# Patient Record
Sex: FEMALE | Race: WHITE | NOT HISPANIC OR LATINO | Employment: OTHER | URBAN - METROPOLITAN AREA
[De-identification: names, ages, dates, MRNs, and addresses within clinical notes are randomized per-mention and may not be internally consistent; named-entity substitution may affect disease eponyms.]

---

## 2017-03-01 ENCOUNTER — GENERIC CONVERSION - ENCOUNTER (OUTPATIENT)
Dept: OTHER | Facility: OTHER | Age: 82
End: 2017-03-01

## 2017-04-10 ENCOUNTER — ALLSCRIPTS OFFICE VISIT (OUTPATIENT)
Dept: OTHER | Facility: OTHER | Age: 82
End: 2017-04-10

## 2017-04-24 ENCOUNTER — ALLSCRIPTS OFFICE VISIT (OUTPATIENT)
Dept: OTHER | Facility: OTHER | Age: 82
End: 2017-04-24

## 2017-04-24 DIAGNOSIS — K59.01 SLOW TRANSIT CONSTIPATION: ICD-10-CM

## 2017-05-01 ENCOUNTER — GENERIC CONVERSION - ENCOUNTER (OUTPATIENT)
Dept: OTHER | Facility: OTHER | Age: 82
End: 2017-05-01

## 2017-05-01 ENCOUNTER — TRANSCRIBE ORDERS (OUTPATIENT)
Dept: ADMINISTRATIVE | Facility: HOSPITAL | Age: 82
End: 2017-05-01

## 2017-05-01 ENCOUNTER — HOSPITAL ENCOUNTER (OUTPATIENT)
Dept: RADIOLOGY | Facility: HOSPITAL | Age: 82
Discharge: HOME | End: 2017-05-01
Attending: FAMILY MEDICINE
Payer: MEDICARE

## 2017-05-01 DIAGNOSIS — K59.01 SLOW TRANSIT CONSTIPATION: ICD-10-CM

## 2017-05-01 PROCEDURE — 74022 RADEX COMPL AQT ABD SERIES: CPT

## 2017-05-11 ENCOUNTER — LAB CONVERSION - ENCOUNTER (OUTPATIENT)
Dept: OTHER | Facility: OTHER | Age: 82
End: 2017-05-11

## 2017-05-11 ENCOUNTER — GENERIC CONVERSION - ENCOUNTER (OUTPATIENT)
Dept: OTHER | Facility: OTHER | Age: 82
End: 2017-05-11

## 2017-05-11 LAB
A/G RATIO (HISTORICAL): 1.1 (CALC) (ref 1–2.5)
ALBUMIN SERPL BCP-MCNC: 3.3 G/DL (ref 3.6–5.1)
ALP SERPL-CCNC: 86 U/L (ref 33–130)
ALT SERPL W P-5'-P-CCNC: 10 U/L (ref 6–29)
AST SERPL W P-5'-P-CCNC: 13 U/L (ref 10–35)
BASOPHILS # BLD AUTO: 0.2 %
BASOPHILS # BLD AUTO: 14 CELLS/UL (ref 0–200)
BILIRUB SERPL-MCNC: 0.6 MG/DL (ref 0.2–1.2)
BUN SERPL-MCNC: 24 MG/DL (ref 7–25)
BUN/CREA RATIO (HISTORICAL): 44 (CALC) (ref 6–22)
CALCIUM SERPL-MCNC: 8.8 MG/DL (ref 8.6–10.4)
CHLORIDE SERPL-SCNC: 107 MMOL/L (ref 98–110)
CO2 SERPL-SCNC: 26 MMOL/L (ref 20–31)
CREAT SERPL-MCNC: 0.55 MG/DL (ref 0.6–0.88)
DEPRECATED RDW RBC AUTO: 13.9 % (ref 11–15)
EGFR AFRICAN AMERICAN (HISTORICAL): 102 ML/MIN/1.73M2
EGFR-AMERICAN CALC (HISTORICAL): 88 ML/MIN/1.73M2
EOSINOPHIL # BLD AUTO: 147 CELLS/UL (ref 15–500)
EOSINOPHIL # BLD AUTO: 2.1 %
GAMMA GLOBULIN (HISTORICAL): 3 G/DL (CALC) (ref 1.9–3.7)
GLUCOSE (HISTORICAL): 88 MG/DL (ref 65–99)
HBA1C MFR BLD HPLC: 6.6 % OF TOTAL HGB
HCT VFR BLD AUTO: 37.7 % (ref 35–45)
HGB BLD-MCNC: 12.5 G/DL (ref 11.7–15.5)
LYMPHOCYTES # BLD AUTO: 2289 CELLS/UL (ref 850–3900)
LYMPHOCYTES # BLD AUTO: 32.7 %
MCH RBC QN AUTO: 31.1 PG (ref 27–33)
MCHC RBC AUTO-ENTMCNC: 33 G/DL (ref 32–36)
MCV RBC AUTO: 94.2 FL (ref 80–100)
MONOCYTES # BLD AUTO: 350 CELLS/UL (ref 200–950)
MONOCYTES (HISTORICAL): 5 %
NEUTROPHILS # BLD AUTO: 4200 CELLS/UL (ref 1500–7800)
NEUTROPHILS # BLD AUTO: 60 %
PLATELET # BLD AUTO: 143 THOUSAND/UL (ref 140–400)
PMV BLD AUTO: 11.4 FL (ref 7.5–12.5)
POTASSIUM SERPL-SCNC: 4.6 MMOL/L (ref 3.5–5.3)
RBC # BLD AUTO: 4.01 MILLION/UL (ref 3.8–5.1)
SODIUM SERPL-SCNC: 142 MMOL/L (ref 135–146)
TOTAL PROTEIN (HISTORICAL): 6.3 G/DL (ref 6.1–8.1)
WBC # BLD AUTO: 7 THOUSAND/UL (ref 3.8–10.8)

## 2017-05-16 ENCOUNTER — GENERIC CONVERSION - ENCOUNTER (OUTPATIENT)
Dept: OTHER | Facility: OTHER | Age: 82
End: 2017-05-16

## 2017-05-16 ENCOUNTER — LAB CONVERSION - ENCOUNTER (OUTPATIENT)
Dept: OTHER | Facility: OTHER | Age: 82
End: 2017-05-16

## 2017-05-16 LAB
CREATININE, RANDOM URINE (HISTORICAL): 72 MG/DL (ref 20–320)
MAGNESIUM, UR (HISTORICAL): 2.1 MG/DL
MICROALBUMIN/CREATININE RATIO (HISTORICAL): 29 MCG/MG CREAT

## 2017-07-25 ENCOUNTER — APPOINTMENT (OUTPATIENT)
Dept: LAB | Facility: HOSPITAL | Age: 82
End: 2017-07-25
Attending: FAMILY MEDICINE
Payer: MEDICARE

## 2017-07-25 ENCOUNTER — TRANSCRIBE ORDERS (OUTPATIENT)
Dept: ADMINISTRATIVE | Facility: HOSPITAL | Age: 82
End: 2017-07-25

## 2017-07-25 DIAGNOSIS — N39.0 URINARY TRACT INFECTION, SITE NOT SPECIFIED: Primary | ICD-10-CM

## 2017-07-25 LAB
BACTERIA UR QL AUTO: ABNORMAL /HPF
BILIRUB UR QL STRIP: NEGATIVE
CLARITY UR: ABNORMAL
COLOR UR: YELLOW
GLUCOSE UR STRIP-MCNC: NEGATIVE MG/DL
HGB UR QL STRIP.AUTO: ABNORMAL
HYALINE CASTS #/AREA URNS LPF: ABNORMAL /LPF
KETONES UR STRIP-MCNC: NEGATIVE MG/DL
LEUKOCYTE ESTERASE UR QL STRIP: ABNORMAL
NITRITE UR QL STRIP: POSITIVE
NON-SQ EPI CELLS URNS QL MICRO: ABNORMAL /HPF
PH UR STRIP.AUTO: 5.5 [PH] (ref 5–9)
PROT UR STRIP-MCNC: NEGATIVE MG/DL
RBC #/AREA URNS AUTO: ABNORMAL /HPF
SP GR UR STRIP.AUTO: 1.01 (ref 1–1.03)
UROBILINOGEN UR QL STRIP.AUTO: 0.2 E.U./DL
WBC #/AREA URNS AUTO: ABNORMAL /HPF

## 2017-07-25 PROCEDURE — 81001 URINALYSIS AUTO W/SCOPE: CPT | Performed by: FAMILY MEDICINE

## 2017-07-26 ENCOUNTER — GENERIC CONVERSION - ENCOUNTER (OUTPATIENT)
Dept: OTHER | Facility: OTHER | Age: 82
End: 2017-07-26

## 2017-08-14 DIAGNOSIS — E11.42 TYPE 2 DIABETES MELLITUS WITH DIABETIC POLYNEUROPATHY (HCC): ICD-10-CM

## 2017-08-14 DIAGNOSIS — N39.0 URINARY TRACT INFECTION: ICD-10-CM

## 2017-08-21 ENCOUNTER — APPOINTMENT (OUTPATIENT)
Dept: LAB | Facility: HOSPITAL | Age: 82
End: 2017-08-21
Attending: FAMILY MEDICINE
Payer: MEDICARE

## 2017-08-21 DIAGNOSIS — N39.0 URINARY TRACT INFECTION: ICD-10-CM

## 2017-08-21 PROCEDURE — 87186 SC STD MICRODIL/AGAR DIL: CPT

## 2017-08-21 PROCEDURE — 87086 URINE CULTURE/COLONY COUNT: CPT

## 2017-08-21 PROCEDURE — 87077 CULTURE AEROBIC IDENTIFY: CPT

## 2017-08-23 ENCOUNTER — GENERIC CONVERSION - ENCOUNTER (OUTPATIENT)
Dept: OTHER | Facility: OTHER | Age: 82
End: 2017-08-23

## 2017-08-23 LAB — BACTERIA UR CULT: NORMAL

## 2017-09-11 ENCOUNTER — GENERIC CONVERSION - ENCOUNTER (OUTPATIENT)
Dept: OTHER | Facility: OTHER | Age: 82
End: 2017-09-11

## 2017-09-15 ENCOUNTER — LAB CONVERSION - ENCOUNTER (OUTPATIENT)
Dept: OTHER | Facility: OTHER | Age: 82
End: 2017-09-15

## 2017-09-15 LAB
A/G RATIO (HISTORICAL): 1.2 (CALC) (ref 1–2.5)
ALBUMIN SERPL BCP-MCNC: 3.1 G/DL (ref 3.6–5.1)
ALP SERPL-CCNC: 73 U/L (ref 33–130)
ALT SERPL W P-5'-P-CCNC: 11 U/L (ref 6–29)
AST SERPL W P-5'-P-CCNC: 12 U/L (ref 10–35)
BASOPHILS # BLD AUTO: 0.4 %
BASOPHILS # BLD AUTO: 27 CELLS/UL (ref 0–200)
BILIRUB SERPL-MCNC: 0.6 MG/DL (ref 0.2–1.2)
BUN SERPL-MCNC: 23 MG/DL (ref 7–25)
BUN/CREA RATIO (HISTORICAL): 43 (CALC) (ref 6–22)
CALCIUM SERPL-MCNC: 8.9 MG/DL (ref 8.6–10.4)
CHLORIDE SERPL-SCNC: 106 MMOL/L (ref 98–110)
CHOLEST SERPL-MCNC: 149 MG/DL
CHOLEST/HDLC SERPL: 3.7 (CALC)
CO2 SERPL-SCNC: 27 MMOL/L (ref 20–31)
CREAT SERPL-MCNC: 0.53 MG/DL (ref 0.6–0.88)
DEPRECATED RDW RBC AUTO: 13.1 % (ref 11–15)
EGFR AFRICAN AMERICAN (HISTORICAL): 103 ML/MIN/1.73M2
EGFR-AMERICAN CALC (HISTORICAL): 89 ML/MIN/1.73M2
EOSINOPHIL # BLD AUTO: 1.6 %
EOSINOPHIL # BLD AUTO: 109 CELLS/UL (ref 15–500)
GAMMA GLOBULIN (HISTORICAL): 2.6 G/DL (CALC) (ref 1.9–3.7)
GLUCOSE (HISTORICAL): 69 MG/DL (ref 65–99)
HBA1C MFR BLD HPLC: 6.5 % OF TOTAL HGB
HCT VFR BLD AUTO: 34.5 % (ref 35–45)
HDLC SERPL-MCNC: 40 MG/DL
HGB BLD-MCNC: 11.4 G/DL (ref 11.7–15.5)
LDL CHOLESTEROL (HISTORICAL): 87 MG/DL (CALC)
LYMPHOCYTES # BLD AUTO: 2278 CELLS/UL (ref 850–3900)
LYMPHOCYTES # BLD AUTO: 33.5 %
MCH RBC QN AUTO: 30.9 PG (ref 27–33)
MCHC RBC AUTO-ENTMCNC: 33 G/DL (ref 32–36)
MCV RBC AUTO: 93.5 FL (ref 80–100)
MONOCYTES # BLD AUTO: 503 CELLS/UL (ref 200–950)
MONOCYTES (HISTORICAL): 7.4 %
NEUTROPHILS # BLD AUTO: 3883 CELLS/UL (ref 1500–7800)
NEUTROPHILS # BLD AUTO: 57.1 %
NON-HDL-CHOL (CHOL-HDL) (HISTORICAL): 109 MG/DL (CALC)
PLATELET # BLD AUTO: 174 THOUSAND/UL (ref 140–400)
PMV BLD AUTO: 11.6 FL (ref 7.5–12.5)
POTASSIUM SERPL-SCNC: 4 MMOL/L (ref 3.5–5.3)
RBC # BLD AUTO: 3.69 MILLION/UL (ref 3.8–5.1)
SODIUM SERPL-SCNC: 141 MMOL/L (ref 135–146)
TOTAL PROTEIN (HISTORICAL): 5.7 G/DL (ref 6.1–8.1)
TRIGL SERPL-MCNC: 119 MG/DL
WBC # BLD AUTO: 6.8 THOUSAND/UL (ref 3.8–10.8)

## 2017-09-17 ENCOUNTER — GENERIC CONVERSION - ENCOUNTER (OUTPATIENT)
Dept: OTHER | Facility: OTHER | Age: 82
End: 2017-09-17

## 2017-10-09 ENCOUNTER — GENERIC CONVERSION - ENCOUNTER (OUTPATIENT)
Dept: OTHER | Facility: OTHER | Age: 82
End: 2017-10-09

## 2017-10-23 ENCOUNTER — GENERIC CONVERSION - ENCOUNTER (OUTPATIENT)
Dept: OTHER | Facility: OTHER | Age: 82
End: 2017-10-23

## 2017-12-13 ENCOUNTER — ALLSCRIPTS OFFICE VISIT (OUTPATIENT)
Dept: OTHER | Facility: OTHER | Age: 82
End: 2017-12-13

## 2017-12-15 NOTE — PROGRESS NOTES
Assessment  1  Atherosclerotic peripheral vascular disease (440 20) (I70 209)   2  Diabetes mellitus with polyneuropathy (250 60,357 2) (E11 42)   3  Cellulitis of toe of left foot (681 10) (L03 032)   4  Ingrowing nail (703 0) (L60 0)    Plan   · Sulfamethoxazole-Trimethoprim 800-160 MG Oral Tablet; Take 1 tablet twice daily   · You can treat and prevent ingrown toenails ; Status:Complete;   Done: 05XQR1300   · Follow-up visit in 2 weeks Evaluation and Treatment  Follow-up  Status: Complete  Done:65Xvx1259    Discussion/Summary    Bactroban and dressing daily  Warm water and Epsom salts soaks  Patient to call if any fever chills or signs of infection  Discussed importance of proper diabetic foot care  Patient to call if any increasing signs of infection  Discussed risk of bone infection  Possible side effects of new medications were reviewed with the patient/guardian today  The treatment plan was reviewed with the patient/guardian  The patient/guardian understands and agrees with the treatment plan      Chief Complaint  Patient is a homebound diabetic female seen for emergency housecall for infectionThe patient has had redness pus and swelling of left great toe for the past few days  Patient has not had any fever or chills  Active Problems  1  Abscess of left foot (682 7) (L02 612)   2  Abscess of toe of right foot (681 10) (L02 611)   3  Acquired ankle/foot deformity (736 70) (M21 969)   4  Adult BMI 38 0-38 9 kg/sq m (V85 38) (Z68 38)   5  Atherosclerotic peripheral vascular disease (440 20) (I70 209)   6  Callus (700) (L84)   7  Cellulitis of toe of left foot (681 10) (L03 032)   8  Diabetes mellitus with polyneuropathy (250 60,357 2) (E11 42)   9  Diabetic foot ulcer (250 80,707 15) (E11 621,L97 509)   10  Edema (782 3) (R60 9)   11  Encounter for screening for cardiovascular disorders (V81 2) (Z13 6)   12  Frequent UTI (599 0) (N39 0)   13  History of fusion of cervical spine (V45 4) (Z98 1)   14  Ingrowing nail (703 0) (L60 0)   15  Medicare annual wellness visit, initial (V70 0) (Z00 00)   16  Onychomycosis (110 1) (B35 1)   17  Open wound of foot, right, initial encounter (892 0) (S91 301A)   18  Paronychia of toe (681 11) (L03 039)   19  Screening for thyroid disorder (V77 0) (Z13 29)   20  Slow transit constipation (564 01) (K59 01)   21  Venous reflux (459 81) (I87 2)   22  Vitamin B12 deficiency (266 2) (E53 8)    Past Medical History   · History of pulmonary embolism (V12 55) (Z86 711)   · History of thromboembolism (V12 51) (Z86 718)    Surgical History   · History of Appendectomy   · History of Cervical Vertebral Fusion   · History of Cholecystectomy   · History of Inferior Vena Cava Filter Placement W/ Fluorosc Angiogr Guidance   · History of Tonsillectomy   · History of Total Abdominal Hysterectomy With Bilateral Salpingo-Oophorectomy   · History of Total Hip Replacement    The surgical history was reviewed and updated today  Family History  Brother    · Family history of cardiac disorder (V17 49) (Z82 49)   · Family history of diabetes mellitus (V18 0) (Z83 3)    Social History   · Never a smoker   · Occasional alcohol use    Current Meds   1  AMILoride HCl - 5 MG Oral Tablet; TAKE 1 TABLET DAILY WITH FOOD  Requested for: 59Lvn7289; Last Rx:29Wez5520 Ordered   2  Aspir-81 81 MG Oral Tablet Delayed Release; TAKE 1 TABLET DAILY AS DIRECTED; Therapy: 14JOZ7157 to Recorded   3  Ruthie Contour Next EZ w/Device Kit; check blood sugar twice  a day; Therapy: 86QGR2430 to (Last Rx:35Euy0330)  Requested for: 32YUZ8095 Ordered   4  Ruthie Contour Next Test In [employer]; test blood sugars two times a day DX: 250; Therapy: 36RYW8235 to (Last Rx:76Yda2661)  Requested for: 34NUI9005 Ordered   5  BD Pen Needle Mini U/F 31G X 5 MM Miscellaneous; USE AS DIRECTED; Therapy: 41MZG7939 to (Last Rx:08Jun2017)  Requested for: 00RIQ8636 Ordered   6   Cyanocobalamin 1000 MCG/ML Injection Solution; INJECT 1  ML Intramuscular; Therapy: 90LYG8248 to (Last Rx:95Dfc3695)  Requested for: 62Uyn8838 Ordered   7  EZ-Lets Lancets 21G Miscellaneous; TEST BLOOD SUGAR TWICE DAILY; Therapy: 89JEF4510 to (Last Rx:98Eor8792)  Requested for: 56KSG0818 Ordered   8  Furosemide 40 MG Oral Tablet; once daily at 8 am  Requested for: 94Ubz3271; Last Rx:94Hgm7982 Ordered   9  NovoLOG SOLN; administered through sliding scale; Therapy: (Recorded:09Oct2017) to Recorded   10  Repaglinide 2 MG Oral Tablet; 2mg before breakfast and lunch, 4mg before dinner   Requested for: 98PGD9440; Last Rx:04Tre1530 Ordered   11  Senna 8 6 MG Oral Tablet; 2 TABLETS DAILY; Therapy: 87GSN5024 to Recorded   12  Tylenol 325 MG Oral Tablet; TAKE 1 TO 2 TABLETS EVERY 4 HOURS AS NEEDED; Therapy: 71FPK5234 to (Evaluate:11Oct2016) Recorded    The medication list was reviewed and updated today  Allergies  1  Advair HFA AERO   2  Avelox TABS   3  Cough SYRP   4  Keflex TABS   5  Penicillins   6  Vicotuss   7  Zestril TABS    Physical Exam   Constitutional: no acute distress, well appearing and well nourished  Cardiovascular: abnormal dorsalis pedis pulse,-- abnormal posterior tibialis pulse,-- dependence rubor,-- abnormal capillary refill-- and-- edema  Orthopedic/Biomechanical: hammertoe(s),-- bunion(s),-- abnormal midtarsal joint ROM,-- decreased strength with dorsiflexion,-- ambulates with a walker,-- discolored nails-- and-- subungual debris, but-- normal MPJ ROM  Skin: abnormal texture,-- decreased skin turgor,-- edema-- and-- keratoses  Neurologic: decreased response to light touch,-- decreased vibration sensation-- and-- decreased response to monofilament testing  Psychiatric: oriented to person, place, and time  Left Foot: Appearance: a deformity  Second toe deformities include hammer toe  Third toe deformities include hammer toe  Forth toe deformities include hammer toe  Fifth toe deformities include hammer toe   Evaluation of the great toe nail demonstrates no paronychia-- and-- no ingrown nail  Left hallux lateral border positive paronychia positive purulence negative abscess negative ascending cellulitis localized erythema  Special Tests: +3 pitting edema bilateral feet and ankles edema bilateral legs positive venous stasis negative calf tenderness negative Homans sign  Right Foot: Appearance: a deformity  Great toe deformities include a bunion  Second toe deformities include hammer toe  Third toe deformities include hammer toe  Forth toe deformities include hammer toe  Fifth toe deformities include hammer toe  Evaluation of the great toe nail demonstrates an ingrown nail  All nails thick discolored dystrophic, positive subungual debris, positive pain on palpation  Special Tests: right hallux medial border Mildly red negative abscess mild erythem  Neurological Exam: performed  Light touch was decreased bilaterally  Vibratory sensation was absent bilaterally  Response to monofilament test was absent bilaterally  Vascular Exam: performed Dorsalis pedis pulses were absent bilaterally  Posterior tibial pulses were absent bilaterally  Capillary refill time was greater than 3 seconds bilaterally  Edema: severe bilaterally  Toenails: All of the toenails were elongated,-- hypertrophied,-- discolored,-- shown to have subungual debris-- and-- tender  Both first toenails were ingrown  Diminished tactile sensation with monofilament testing throughout the right foot  Diminished tactile sensation with monofilament testing throughout the left foot  Capillary refills findings on the right were delayed in the toes  Pulses:  0 in the posterior tibialis on the right  0 in the dorsalis pedis on the right  Capillary refills findings on the left were delayed in the toes  Pulses:  0 in the posterior tibialis on the left  0 in the dorsalis pedis on the left    Assign Risk Category: 2: Loss of protective sensation with or without weakness, deformity, callus, pre-ulcer, or history of ulceration  High risk  Hyperkeratosis: present on both first sub metatarsals-- and-- present on both fifth sub metatarsals  Shoe Gear Evaluation: performed ()  Right Foot: width: e-- and-- length: 9  Left Foot: width: e-- and-- length: 9  Recommendation(s): extra depth diabetic shoes-- and-- SAS style  Procedure  Wedge resection of ingrown nail both borders of bilateral hallux  Applied silver nitrate and dry sterile dressing  All nails debrided  Signatures   Electronically signed by :  Lea Jean DPM; Dec 13 2017  3:38PM EST                       (Author)

## 2018-01-09 NOTE — RESULT NOTES
Verified Results  (1) URINE CULTURE [id number] 02:44PM Senthil Betancourt Order Number: [id number]     Test Name Result Flag Reference   CLINICAL REPORT (Report)     Test:        Urine culture  Specimen Type:   Urine  Specimen Date:   8/21/2017 2:44 PM  Result Date:    8/23/2017 5:01 PM  Result Status:   Final result  Resulting Lab:   [place] [address]            Tel: [phone]      CULTURE                                       ------------------                                   >100,000 cfu/ml Escherichia coli      SUSCEPTIBILITY                                   ------------------                                                       Escherichia coli  METHOD                 KAR  -------------------------------------  -------------------------  AMPICILLIN ($$)             <=8 00 ug/ml Susceptible  AZTREONAM ($$$)             <=8 ug/ml   Susceptible  CEFAZOLIN ($)              <=8 00 ug/ml Susceptible  CIPROFLOXACIN ($)            <=1 00 ug/ml Susceptible  GENTAMICIN ($$)             <=4 ug/ml   Susceptible  LEVOFLOXACIN ($)            <=2 00 ug/ml Susceptible  NITROFURANTOIN             <=32 ug/ml  Susceptible  PIPERACILLIN + TAZOBACTAM ($$$)     <=16 ug/ml  Susceptible  TETRACYCLINE              <=4 ug/ml   Susceptible  TOBRAMYCIN ($)             <=4 ug/ml   Susceptible  TRIMETHOPRIM + SULFAMETHOXAZOLE ($$$)  <=2/38 ug/ml Susceptible       Plan  Frequent UTI    · Sulfamethoxazole-Trimethoprim 400-80 MG Oral Tablet; TAKE 1 TABLET TWICE  DAILY

## 2018-01-10 NOTE — RESULT NOTES
Discussion/Summary   normal x-ray  GI evelauation if symptoms persist     Verified Results  * XR ABDOMEN OBSTRUCTION SERIES [id number] 10:49AM Luis Eduardo Betancourt Rater Order Number: [id number]     Test Name Result Flag Reference   XR ABDOMEN OBSTRUCTION SERIES (Report)     OBSTRUCTION SERIES     INDICATION: Constipation  Slow transit time  COMPARISON: None     VIEWS: (Supine, erect abdomen and upright chest)     IMAGES: 5     FINDINGS:   Limited study due to the patient's body habitus  There is a nonobstructive bowel gas pattern  Scattered feces in the colon  No free air beneath the hemidiaphragms  Left upper quadrant calcifications which could represent splenic granulomas or less likely splenic artery aneurysm  Extensive arterial calcifications  Osseous structures are unremarkable  IVC filter  Postoperative changes in the lower cervical spine  Bipolar left hip prostheses  Examination of the chest reveals a normal cardiomediastinal silhouette  Lungs are clear  IMPRESSION:     Nonobstructive bowel gas pattern  Limited study         Workstation performed: [id number]     Signed by:   Shobha Vicente MD   5/1/17

## 2018-01-11 NOTE — RESULT NOTES
Verified Results  (1) COMPREHENSIVE METABOLIC PANEL [id number] 93:83JW Toya Betancourt   REPORT COMMENT:  FASTING:YES     Test Name Result Flag Reference   GLUCOSE 79 mg/dL  65-99   Fasting reference interval   UREA NITROGEN (BUN) 24 mg/dL  7-25   CREATININE 0 60 mg/dL  0 60-0 88   For patients >52years of age, the reference limit  for Creatinine is approximately 13% higher for people  identified as -American  eGFR NON-AFR  AMERICAN 86 mL/min/1 73m2  > OR = 60   eGFR AFRICAN AMERICAN 100 mL/min/1 73m2  > OR = 60   BUN/CREATININE RATIO   9-68   NOT APPLICABLE (calc)   ALT 12 U/L  6-29   ALBUMIN 3 1 g/dL L 3 6-5 1   GLOBULIN 2 7 g/dL (calc)  1 9-3 7   ALBUMIN/GLOBULIN RATIO 1 1 (calc)  1 0-2 5   BILIRUBIN, TOTAL 0 4 mg/dL  0 2-1 2   ALKALINE PHOSPHATASE 79 U/L     AST 11 U/L  10-35   SODIUM 141 mmol/L  135-146   POTASSIUM 4 5 mmol/L  3 5-5 3   CHLORIDE 108 mmol/L     CARBON DIOXIDE 23 mmol/L  20-31   CALCIUM 8 8 mg/dL  8 6-10 4   PROTEIN, TOTAL 5 8 g/dL L 6 1-8 1       Discussion/Summary   Labs are stable or in normal range  Continue medications   Repeat in 6 months

## 2018-01-11 NOTE — RESULT NOTES
Verified Results  (1) CBC/PLT/DIFF 14Sep2017 09:00AM Angel Parkinson     Test Name Result Flag Reference   WHITE BLOOD CELL COUNT 6 8 Thousand/uL  3 8-10 8   RED BLOOD CELL COUNT 3 69 Million/uL L 3 80-5 10   HEMOGLOBIN 11 4 g/dL L 11 7-15 5   HEMATOCRIT 34 5 % L 35 0-45 0   MCV 93 5 fL  80 0-100 0   MCH 30 9 pg  27 0-33 0   MCHC 33 0 g/dL  32 0-36 0   RDW 13 1 %  11 0-15 0   PLATELET COUNT 644 Thousand/uL  140-400   ABSOLUTE NEUTROPHILS 3883 cells/uL  5865-9967   ABSOLUTE LYMPHOCYTES 2278 cells/uL  850-3900   ABSOLUTE MONOCYTES 503 cells/uL  200-950   ABSOLUTE EOSINOPHILS 109 cells/uL     ABSOLUTE BASOPHILS 27 cells/uL  0-200   NEUTROPHILS 57 1 %     LYMPHOCYTES 33 5 %     MONOCYTES 7 4 %     EOSINOPHILS 1 6 %     BASOPHILS 0 4 %     MPV 11 6 fL  7 5-12 5     (1) COMPREHENSIVE METABOLIC PANEL [id number] 30:35TM Slacker     Test Name Result Flag Reference   GLUCOSE 69 mg/dL  65-99   Fasting reference interval   UREA NITROGEN (BUN) 23 mg/dL  7-25   CREATININE 0 53 mg/dL L 0 60-0 88   For patients >52years of age, the reference limit  for Creatinine is approximately 13% higher for people  identified as -American  eGFR NON-AFR   AMERICAN 89 mL/min/1 73m2  > OR = 60   eGFR AFRICAN AMERICAN 103 mL/min/1 73m2  > OR = 60   BUN/CREATININE RATIO 43 (calc) H 6-22   SODIUM 141 mmol/L  135-146   POTASSIUM 4 0 mmol/L  3 5-5 3   CHLORIDE 106 mmol/L     CARBON DIOXIDE 27 mmol/L  20-31   CALCIUM 8 9 mg/dL  8 6-10 4   PROTEIN, TOTAL 5 7 g/dL L 6 1-8 1   ALBUMIN 3 1 g/dL L 3 6-5 1   GLOBULIN 2 6 g/dL (calc)  1 9-3 7   ALBUMIN/GLOBULIN RATIO 1 2 (calc)  1 0-2 5   BILIRUBIN, TOTAL 0 6 mg/dL  0 2-1 2   ALKALINE PHOSPHATASE 73 U/L     AST 12 U/L  10-35   ALT 11 U/L  6-29     (1) LIPID PANEL, FASTING [id number] 09:00AM Slacker     Test Name Result Flag Reference   CHOLESTEROL, TOTAL 149 mg/dL  <200   HDL CHOLESTEROL 40 mg/dL L >45   TRIGLICERIDES 487 mg/dL  <150   LDL-CHOLESTEROL 87 mg/dL (calc) Reference range: <100     Desirable range <100 mg/dL for patients with CHD or  diabetes and <70 mg/dL for diabetic patients with  known heart disease  LDL-C is now calculated using the Nahun-Rm   calculation, which is a validated novel method providing   better accuracy than the Friedewald equation in the   estimation of LDL-C  Gordon Pineda  [place] [id number];654(09): [phone]   (http://[facility]/faq/JRJ955)   CHOL/HDLC RATIO 3 7 (calc)  <5 0   NON HDL CHOLESTEROL 109 mg/dL (calc)  <130   For patients with diabetes plus 1 major ASCVD risk   factor, treating to a non-HDL-C goal of <100 mg/dL   (LDL-C of <70 mg/dL) is considered a therapeutic   option  (Q) HEMOGLOBIN A1c [id number] 09:00AM Brooke Craig   REPORT COMMENT:  FASTING:YES     Test Name Result Flag Reference   HEMOGLOBIN A1c 6 5 % of total Hgb H <5 7   For someone without known diabetes, a hemoglobin A1c  value of 6 5% or greater indicates that they may have   diabetes and this should be confirmed with a follow-up   test      For someone with known diabetes, a value <7% indicates   that their diabetes is well controlled and a value   greater than or equal to 7% indicates suboptimal   control  A1c targets should be individualized based on   duration of diabetes, age, comorbid conditions, and   other considerations  Currently, no consensus exists regarding use of  hemoglobin A1c for diagnosis of diabetes for children

## 2018-01-14 VITALS
DIASTOLIC BLOOD PRESSURE: 80 MMHG | HEART RATE: 62 BPM | SYSTOLIC BLOOD PRESSURE: 132 MMHG | WEIGHT: 240 LBS | TEMPERATURE: 97.2 F | BODY MASS INDEX: 38.57 KG/M2 | HEIGHT: 66 IN | RESPIRATION RATE: 18 BRPM

## 2018-01-14 VITALS
DIASTOLIC BLOOD PRESSURE: 78 MMHG | WEIGHT: 240 LBS | RESPIRATION RATE: 18 BRPM | TEMPERATURE: 98.2 F | BODY MASS INDEX: 38.57 KG/M2 | SYSTOLIC BLOOD PRESSURE: 130 MMHG | HEART RATE: 66 BPM | HEIGHT: 66 IN

## 2018-01-15 NOTE — RESULT NOTES
Verified Results  (Q) MICROALBUMIN, RANDOM URINE (W/CREATININE) [id number] 11:58AM Cherry Betancourt Pouch   REPORT COMMENT:  SPLIT 05/10/2017 FROM [id number]  FASTING:NO     Test Name Result Flag Reference   CREATININE, RANDOM URINE 72 mg/dL     MICROALBUMIN 2 1 mg/dL     Reference Range  Not established   MICROALBUMIN/CREATININE$RATIO, RANDOM URINE 29 mcg/mg creat  <30   The ADA defines abnormalities in albumin  excretion as follows:     Category         Result (mcg/mg creatinine)     Normal                    <30  Microalbuminuria            Clinical albuminuria   > OR = 300     The ADA recommends that at least two of three  specimens collected within a 3-6 month period be  abnormal before considering a patient to be  within a diagnostic category

## 2018-01-15 NOTE — RESULT NOTES
Discussion/Summary   labs are stable  continue medications  Hydrate well  Verified Results  (1) COMPREHENSIVE METABOLIC PANEL [id number] 23:67JN Magi Betancourt     Test Name Result Flag Reference   GLUCOSE 88 mg/dL  65-99   Fasting reference interval   UREA NITROGEN (BUN) 24 mg/dL  7-25   CREATININE 0 55 mg/dL L 0 60-0 88   For patients >52years of age, the reference limit  for Creatinine is approximately 13% higher for people  identified as -American  eGFR NON-AFR  AMERICAN 88 mL/min/1 73m2  > OR = 60   eGFR AFRICAN AMERICAN 102 mL/min/1 73m2  > OR = 60   BUN/CREATININE RATIO 44 (calc) H 6-22   SODIUM 142 mmol/L  135-146   POTASSIUM 4 6 mmol/L  3 5-5 3   CHLORIDE 107 mmol/L     CARBON DIOXIDE 26 mmol/L  20-31   CALCIUM 8 8 mg/dL  8 6-10 4   PROTEIN, TOTAL 6 3 g/dL  6 1-8 1   ALBUMIN 3 3 g/dL L 3 6-5 1   GLOBULIN 3 0 g/dL (calc)  1 9-3 7   ALBUMIN/GLOBULIN RATIO 1 1 (calc)  1 0-2 5   BILIRUBIN, TOTAL 0 6 mg/dL  0 2-1 2   ALKALINE PHOSPHATASE 86 U/L     AST 13 U/L  10-35   ALT 10 U/L  6-29     (1) CBC/PLT/DIFF [id number] 09:20AM Magi Betancourt     Test Name Result Flag Reference   WHITE BLOOD CELL COUNT 7 0 Thousand/uL  3 8-10 8   RED BLOOD CELL COUNT 4 01 Million/uL  3 80-5 10   HEMOGLOBIN 12 5 g/dL  11 7-15 5   HEMATOCRIT 37 7 %  35 0-45 0   MCV 94 2 fL  80 0-100 0   MCH 31 1 pg  27 0-33 0   MCHC 33 0 g/dL  32 0-36 0   RDW 13 9 %  11 0-15 0   PLATELET COUNT 430 Thousand/uL  140-400   ABSOLUTE NEUTROPHILS 4200 cells/uL  9248-0423   ABSOLUTE LYMPHOCYTES 2289 cells/uL  850-3900   ABSOLUTE MONOCYTES 350 cells/uL  200-950   ABSOLUTE EOSINOPHILS 147 cells/uL     ABSOLUTE BASOPHILS 14 cells/uL  0-200   NEUTROPHILS 60 0 %     LYMPHOCYTES 32 7 %     MONOCYTES 5 0 %     EOSINOPHILS 2 1 %     BASOPHILS 0 2 %     MPV 11 4 fL  7 5-12 5     (Q) HEMOGLOBIN A1c [id number] 09:20AM Magi Betancourt Kins   REPORT COMMENT:  PATIENT UNABLE TO VOID; ADVISED TO RETURN FOR COLLECTION       Test Name Result Flag Reference   HEMOGLOBIN A1c 6 6 % of total Hgb H <5 7   For someone without known diabetes, a hemoglobin A1c  value of 6 5% or greater indicates that they may have   diabetes and this should be confirmed with a follow-up   test      For someone with known diabetes, a value <7% indicates   that their diabetes is well controlled and a value   greater than or equal to 7% indicates suboptimal   control  A1c targets should be individualized based on   duration of diabetes, age, comorbid conditions, and   other considerations  Currently, no consensus exists regarding use of  hemoglobin A1c for diagnosis of diabetes for children

## 2018-01-17 NOTE — RESULT NOTES
Verified Results  (1) URINALYSIS (will reflex a microscopy if leukocytes, occult blood, protein or nitrites are not within normal limits) [id number] 08:45AM Marnie Betancourt [employer]     Test Name Result Flag Reference   COLOR Yellow     CLARITY Slightly Cloudy     SPECIFIC GRAVITY UA 1 010  1 000-1 030   PH UA 5 5  5 0-9 0   LEUKOCYTE ESTERASE UA Small A Negative   NITRITE UA Positive A Negative   PROTEIN UA Negative mg/dl  Negative   GLUCOSE UA Negative mg/dl  Negative   KETONES UA Negative mg/dl  Negative   UROBILINOGEN UA 0 2 E U /dl  0 2, 1 0 E U /dl   BILIRUBIN UA Negative  Negative   BLOOD UA Trace-Intact A Negative     (1) URINALYSIS (will reflex a microscopy if leukocytes, occult blood, protein or nitrites are not within normal limits) [id number] 08:45AM Marnie Betancourt [employer]     Test Name Result Flag Reference   BACTERIA Innumerable /hpf A None Seen, Occasional   EPITHELIAL CELLS Moderate /hpf A None Seen, Occasional   HYALINE CASTS 0-1 /lpf A (none)   RBC UA 0-1 /hpf A None Seen   WBC UA 20-30 /hpf A None Seen   occ WBC clump       Plan  Frequent UTI    · Sulfamethoxazole-Trimethoprim 400-80 MG Oral Tablet; TAKE 1 TABLET TWICE  DAILY

## 2018-01-22 VITALS
SYSTOLIC BLOOD PRESSURE: 138 MMHG | HEART RATE: 70 BPM | TEMPERATURE: 96.8 F | RESPIRATION RATE: 18 BRPM | DIASTOLIC BLOOD PRESSURE: 74 MMHG | HEIGHT: 66 IN

## 2018-02-02 ENCOUNTER — TELEPHONE (OUTPATIENT)
Dept: FAMILY MEDICINE CLINIC | Facility: CLINIC | Age: 83
End: 2018-02-02

## 2018-02-02 NOTE — LETTER
February 7, 2018    [address] [place] [id number]      Dear Ms Bautista: Stacey Jolley is a patient in our practice  She is physically disabled and unable to get to the bank and/or be the agent for her , Rafael Stephens  If you have any questions or concerns, please don't hesitate to call      Sincerely,           Terri Maradiaga MD        CC: No Recipients

## 2018-02-02 NOTE — TELEPHONE ENCOUNTER
DR ROBERTS [facility]   Patient daughter needs a letter on letterhead, stating her mother is relinquishing her power of  over her father, due to physical disabilities  Father is Murlean Crew  Her mother is confined to a wheel chair

## 2018-02-06 NOTE — TELEPHONE ENCOUNTER
Spoke with pt herself and daughter was also on the other line  Pt  States she would like a letter relinquishing her power of  to her daughter Bossman Riley for her father Jessie Soto  Pt states she would like her daughter to have power of  as well  af/rma

## 2018-02-07 NOTE — TELEPHONE ENCOUNTER
I spoke with patient's daughter  She does not need a letter about POA, she and her sister are already on the patient's POA  She needs a letter stating that her mother is disabled and is physically unable to get to the bank and be the agent for her , Alexandria Arteaga  I printed the letter for her, I told her it would be ready at the end of the day for her to

## 2018-02-07 NOTE — TELEPHONE ENCOUNTER
I discussed with other physicians in the practice  This is something that has to be done by Verna Acuna and her   I can't change power of

## 2018-04-11 ENCOUNTER — TELEPHONE (OUTPATIENT)
Dept: FAMILY MEDICINE CLINIC | Facility: CLINIC | Age: 83
End: 2018-04-11

## 2018-04-11 DIAGNOSIS — E53.8 VITAMIN B12 DEFICIENCY: ICD-10-CM

## 2018-04-11 DIAGNOSIS — E11.42 DIABETIC POLYNEUROPATHY ASSOCIATED WITH TYPE 2 DIABETES MELLITUS (HCC): Primary | ICD-10-CM

## 2018-04-11 PROBLEM — K59.01 SLOW TRANSIT CONSTIPATION: Status: ACTIVE | Noted: 2017-04-24

## 2018-04-11 NOTE — TELEPHONE ENCOUNTER
[facility] to come out and draw blood for Aruna  Can we call and set this up    She has an apt with Dr Bobby Stone on April 30th so she will need it before      [phone] daughter Ozzie Pedrorobert

## 2018-04-11 NOTE — TELEPHONE ENCOUNTER
Please advise, I filled out the form to have [facility] go to pts house  Can you order lab work for pt so I can fax the orders with the form   InelGabi Hurtado

## 2018-04-20 LAB
ALBUMIN SERPL-MCNC: 3.5 G/DL (ref 3.6–5.1)
ALBUMIN/GLOB SERPL: 1.3 (CALC) (ref 1–2.5)
ALP SERPL-CCNC: 73 U/L (ref 33–130)
ALT SERPL-CCNC: 14 U/L (ref 6–29)
AST SERPL-CCNC: 13 U/L (ref 10–35)
BASOPHILS # BLD AUTO: 30 CELLS/UL (ref 0–200)
BASOPHILS NFR BLD AUTO: 0.5 %
BILIRUB SERPL-MCNC: 0.7 MG/DL (ref 0.2–1.2)
BUN SERPL-MCNC: 26 MG/DL (ref 7–25)
BUN/CREAT SERPL: 49 (CALC) (ref 6–22)
CALCIUM SERPL-MCNC: 9 MG/DL (ref 8.6–10.4)
CHLORIDE SERPL-SCNC: 105 MMOL/L (ref 98–110)
CHOLEST SERPL-MCNC: 185 MG/DL
CHOLEST/HDLC SERPL: 4.5 (CALC)
CO2 SERPL-SCNC: 30 MMOL/L (ref 20–31)
CREAT SERPL-MCNC: 0.53 MG/DL (ref 0.6–0.88)
EOSINOPHIL # BLD AUTO: 142 CELLS/UL (ref 15–500)
EOSINOPHIL NFR BLD AUTO: 2.4 %
ERYTHROCYTE [DISTWIDTH] IN BLOOD BY AUTOMATED COUNT: 12.6 % (ref 11–15)
EST. AVERAGE GLUCOSE BLD GHB EST-MCNC: 143 (CALC)
EST. AVERAGE GLUCOSE BLD GHB EST-SCNC: 7.9 (CALC)
GLOBULIN SER CALC-MCNC: 2.8 G/DL (CALC) (ref 1.9–3.7)
GLUCOSE SERPL-MCNC: 115 MG/DL (ref 65–99)
HBA1C MFR BLD: 6.6 % OF TOTAL HGB
HCT VFR BLD AUTO: 38.3 % (ref 35–45)
HDLC SERPL-MCNC: 41 MG/DL
HGB BLD-MCNC: 12.7 G/DL (ref 11.7–15.5)
LDLC SERPL CALC-MCNC: 116 MG/DL (CALC)
LYMPHOCYTES # BLD AUTO: 1794 CELLS/UL (ref 850–3900)
LYMPHOCYTES NFR BLD AUTO: 30.4 %
MCH RBC QN AUTO: 31.9 PG (ref 27–33)
MCHC RBC AUTO-ENTMCNC: 33.2 G/DL (ref 32–36)
MCV RBC AUTO: 96.2 FL (ref 80–100)
MONOCYTES # BLD AUTO: 342 CELLS/UL (ref 200–950)
MONOCYTES NFR BLD AUTO: 5.8 %
NEUTROPHILS # BLD AUTO: 3593 CELLS/UL (ref 1500–7800)
NEUTROPHILS NFR BLD AUTO: 60.9 %
NONHDLC SERPL-MCNC: 144 MG/DL (CALC)
PLATELET # BLD AUTO: 177 THOUSAND/UL (ref 140–400)
PMV BLD REES-ECKER: 11.7 FL (ref 7.5–12.5)
POTASSIUM SERPL-SCNC: 4.5 MMOL/L (ref 3.5–5.3)
PROT SERPL-MCNC: 6.3 G/DL (ref 6.1–8.1)
RBC # BLD AUTO: 3.98 MILLION/UL (ref 3.8–5.1)
SL AMB EGFR AFRICAN AMERICAN: 102 ML/MIN/1.73M2
SL AMB EGFR NON AFRICAN AMERICAN: 88 ML/MIN/1.73M2
SODIUM SERPL-SCNC: 143 MMOL/L (ref 135–146)
TRIGL SERPL-MCNC: 165 MG/DL
VIT B12 SERPL-MCNC: 1712 PG/ML (ref 200–1100)
WBC # BLD AUTO: 5.9 THOUSAND/UL (ref 3.8–10.8)

## 2018-04-30 ENCOUNTER — OFFICE VISIT (OUTPATIENT)
Dept: FAMILY MEDICINE CLINIC | Facility: CLINIC | Age: 83
End: 2018-04-30
Payer: MEDICARE

## 2018-04-30 VITALS
TEMPERATURE: 98.4 F | HEIGHT: 61 IN | SYSTOLIC BLOOD PRESSURE: 126 MMHG | RESPIRATION RATE: 18 BRPM | HEART RATE: 68 BPM | DIASTOLIC BLOOD PRESSURE: 80 MMHG

## 2018-04-30 DIAGNOSIS — E11.42 TYPE 2 DIABETES MELLITUS WITH DIABETIC POLYNEUROPATHY, WITH LONG-TERM CURRENT USE OF INSULIN (HCC): ICD-10-CM

## 2018-04-30 DIAGNOSIS — E53.8 VITAMIN B12 DEFICIENCY: ICD-10-CM

## 2018-04-30 DIAGNOSIS — Z79.4 TYPE 2 DIABETES MELLITUS WITH DIABETIC POLYNEUROPATHY, WITH LONG-TERM CURRENT USE OF INSULIN (HCC): ICD-10-CM

## 2018-04-30 DIAGNOSIS — Z79.4 TYPE 2 DIABETES MELLITUS WITH DIABETIC POLYNEUROPATHY, WITH LONG-TERM CURRENT USE OF INSULIN (HCC): Primary | ICD-10-CM

## 2018-04-30 DIAGNOSIS — E11.42 DIABETIC POLYNEUROPATHY ASSOCIATED WITH TYPE 2 DIABETES MELLITUS (HCC): ICD-10-CM

## 2018-04-30 DIAGNOSIS — E11.42 TYPE 2 DIABETES MELLITUS WITH DIABETIC POLYNEUROPATHY, WITH LONG-TERM CURRENT USE OF INSULIN (HCC): Primary | ICD-10-CM

## 2018-04-30 DIAGNOSIS — Z00.00 MEDICARE ANNUAL WELLNESS VISIT, SUBSEQUENT: Primary | ICD-10-CM

## 2018-04-30 PROCEDURE — G0439 PPPS, SUBSEQ VISIT: HCPCS | Performed by: INTERNAL MEDICINE

## 2018-04-30 PROCEDURE — 99213 OFFICE O/P EST LOW 20 MIN: CPT | Performed by: INTERNAL MEDICINE

## 2018-04-30 RX ORDER — ASPIRIN 81 MG/1
162 TABLET ORAL DAILY
COMMUNITY
Start: 2016-10-10 | End: 2021-01-27 | Stop reason: HOSPADM

## 2018-04-30 RX ORDER — FUROSEMIDE 40 MG/1
TABLET ORAL
COMMUNITY
End: 2018-04-30 | Stop reason: SDUPTHER

## 2018-04-30 RX ORDER — ACETAMINOPHEN 325 MG/1
2 TABLET ORAL EVERY 4 HOURS PRN
COMMUNITY
Start: 2016-10-10 | End: 2021-01-27 | Stop reason: HOSPADM

## 2018-04-30 RX ORDER — SENNA AND DOCUSATE SODIUM 50; 8.6 MG/1; MG/1
2 TABLET, FILM COATED ORAL DAILY PRN
COMMUNITY
Start: 2016-10-10 | End: 2021-01-18 | Stop reason: ALTCHOICE

## 2018-04-30 RX ORDER — FUROSEMIDE 40 MG/1
40 TABLET ORAL DAILY
Qty: 90 TABLET | Refills: 3 | Status: SHIPPED | OUTPATIENT
Start: 2018-04-30 | End: 2019-04-04 | Stop reason: SDUPTHER

## 2018-04-30 RX ORDER — BLOOD SUGAR DIAGNOSTIC
STRIP MISCELLANEOUS 2 TIMES DAILY
Qty: 100 EACH | Refills: 0
Start: 2018-04-30

## 2018-04-30 RX ORDER — REPAGLINIDE 2 MG/1
2 TABLET ORAL
COMMUNITY
End: 2018-06-04 | Stop reason: SDUPTHER

## 2018-04-30 RX ORDER — CYANOCOBALAMIN 1000 UG/ML
INJECTION INTRAMUSCULAR; SUBCUTANEOUS
COMMUNITY
Start: 2018-04-14 | End: 2018-04-30 | Stop reason: ALTCHOICE

## 2018-04-30 RX ORDER — AMILORIDE HYDROCHLORIDE 5 MG/1
1 TABLET ORAL DAILY
COMMUNITY
End: 2018-06-04 | Stop reason: SDUPTHER

## 2018-04-30 NOTE — ASSESSMENT & PLAN NOTE
Continue insulin and prandin at current dosages  A1C was reviewed and is good  Will follow up in 4 months after bloodwork  Eye exam and foot exam current

## 2018-04-30 NOTE — PATIENT INSTRUCTIONS
Wellness Visit for Adults   AMBULATORY CARE:   A wellness visit  is when you see your healthcare provider to get screened for health problems  You can also get advice on how to stay healthy  Write down your questions so you remember to ask them  Ask your healthcare provider how often you should have a wellness visit  What happens at a wellness visit:  Your healthcare provider will ask about your health, and your family history of health problems  This includes high blood pressure, heart disease, and cancer  He or she will ask if you have symptoms that concern you, if you smoke, and about your mood  You may also be asked about your intake of medicines, supplements, food, and alcohol  Any of the following may be done:  · Your weight  will be checked  Your height may also be checked so your body mass index (BMI) can be calculated  Your BMI shows if you are at a healthy weight  · Your blood pressure  and heart rate will be checked  Your temperature may also be checked  · Blood and urine tests  may be done  Blood tests may be done to check your cholesterol levels  Abnormal cholesterol levels increase your risk for heart disease and stroke  You may also need a blood or urine test to check for diabetes if you are at increased risk  Urine tests may be done to look for signs of an infection or kidney disease  · A physical exam  includes checking your heartbeat and lungs with a stethoscope  Your healthcare provider may also check your skin to look for sun damage  · Screening tests  may be recommended  A screening test is done to check for diseases that may not cause symptoms  The screening tests you may need depend on your age, gender, family history, and lifestyle habits  For example, colorectal screening may be recommended if you are 48years old or older  Screening tests you need if you are a woman:   · A Pap smear  is used to screen for cervical cancer   Pap smears are usually done every 3 to 5 years depending on your age  You may need them more often if you have had abnormal Pap smear test results in the past  Ask your healthcare provider how often you should have a Pap smear  · A mammogram  is an x-ray of your breasts to screen for breast cancer  Experts recommend mammograms every 2 years starting at age 48 years  You may need a mammogram at age 52 years or younger if you have an increased risk for breast cancer  Talk to your healthcare provider about when you should start having mammograms and how often you need them  Vaccines you may need:   · Get an influenza vaccine  every year  The influenza vaccine protects you from the flu  Several types of viruses cause the flu  The viruses change over time, so new vaccines are made each year  · Get a tetanus-diphtheria (Td) booster vaccine  every 10 years  This vaccine protects you against tetanus and diphtheria  Tetanus is a severe infection that may cause painful muscle spasms and lockjaw  Diphtheria is a severe bacterial infection that causes a thick covering in the back of your mouth and throat  · Get a human papillomavirus (HPV) vaccine  if you are female and aged 23 to 32 or male 23 to 24 and never received it  This vaccine protects you from HPV infection  HPV is the most common infection spread by sexual contact  HPV may also cause vaginal, penile, and anal cancers  · Get a pneumococcal vaccine  if you are aged 72 years or older  The pneumococcal vaccine is an injection given to protect you from pneumococcal disease  Pneumococcal disease is an infection caused by pneumococcal bacteria  The infection may cause pneumonia, meningitis, or an ear infection  · Get a shingles vaccine  if you are aged 61 or older, even if you have had shingles before  The shingles vaccine is an injection to protect you from the varicella-zoster virus  This is the same virus that causes chickenpox   Shingles is a painful rash that develops in people who had chickenpox or have been exposed to the virus  How to eat healthy:  My Plate is a model for planning healthy meals  It shows the types and amounts of foods that should go on your plate  Fruits and vegetables make up about half of your plate, and grains and protein make up the other half  A serving of dairy is included on the side of your plate  The amount of calories and serving sizes you need depends on your age, gender, weight, and height  Examples of healthy foods are listed below:  · Eat a variety of vegetables  such as dark green, red, and orange vegetables  You can also include canned vegetables low in sodium (salt) and frozen vegetables without added butter or sauces  · Eat a variety of fresh fruits , canned fruit in 100% juice, frozen fruit, and dried fruit  · Include whole grains  At least half of the grains you eat should be whole grains  Examples include whole-wheat bread, wheat pasta, brown rice, and whole-grain cereals such as oatmeal     · Eat a variety of protein foods such as seafood (fish and shellfish), lean meat, and poultry without skin (turkey and chicken)  Examples of lean meats include pork leg, shoulder, or tenderloin, and beef round, sirloin, tenderloin, and extra lean ground beef  Other protein foods include eggs and egg substitutes, beans, peas, soy products, nuts, and seeds  · Choose low-fat dairy products such as skim or 1% milk or low-fat yogurt, cheese, and cottage cheese  · Limit unhealthy fats  such as butter, hard margarine, and shortening  Exercise:  Exercise at least 30 minutes per day on most days of the week  Some examples of exercise include walking, biking, dancing, and swimming  You can also fit in more physical activity by taking the stairs instead of the elevator or parking farther away from stores  Include muscle strengthening activities 2 days each week  Regular exercise provides many health benefits   It helps you manage your weight, and decreases your risk for type 2 diabetes, heart disease, stroke, and high blood pressure  Exercise can also help improve your mood  Ask your healthcare provider about the best exercise plan for you  General health and safety guidelines:   · Do not smoke  Nicotine and other chemicals in cigarettes and cigars can cause lung damage  Ask your healthcare provider for information if you currently smoke and need help to quit  E-cigarettes or smokeless tobacco still contain nicotine  Talk to your healthcare provider before you use these products  · Limit alcohol  A drink of alcohol is 12 ounces of beer, 5 ounces of wine, or 1½ ounces of liquor  · Lose weight, if needed  Being overweight increases your risk of certain health conditions  These include heart disease, high blood pressure, type 2 diabetes, and certain types of cancer  · Protect your skin  Do not sunbathe or use tanning beds  Use sunscreen with a SPF 15 or higher  Apply sunscreen at least 15 minutes before you go outside  Reapply sunscreen every 2 hours  Wear protective clothing, hats, and sunglasses when you are outside  · Drive safely  Always wear your seatbelt  Make sure everyone in your car wears a seatbelt  A seatbelt can save your life if you are in an accident  Do not use your cell phone when you are driving  This could distract you and cause an accident  Pull over if you need to make a call or send a text message  · Practice safe sex  Use latex condoms if are sexually active and have more than one partner  Your healthcare provider may recommend screening tests for sexually transmitted infections (STIs)  · Wear helmets, lifejackets, and protective gear  Always wear a helmet when you ride a bike or motorcycle, go skiing, or play sports that could cause a head injury  Wear protective equipment when you play sports  Wear a lifejacket when you are on a boat or doing water sports    © 2017 [employer] Information is for End User's use only and may not be sold, redistributed or otherwise used for commercial purposes  All illustrations and images included in CareNotes® are the copyrighted property of [employer]  or [employer]es[employer]  The above information is an  only  It is not intended as medical advice for individual conditions or treatments  Talk to your doctor, nurse or pharmacist before following any medical regimen to see if it is safe and effective for you      Recent Results (from the past 336 hour(s))   Lipid panel    Collection Time: 04/19/18  9:45 AM   Result Value Ref Range    Total Cholesterol 185 <200 mg/dL    SL AMB HDL CHOLESTEROL 41 (L) >50 mg/dL    Triglycerides 165 (H) <150 mg/dL    SL AMB LDL-CHOLESTEROL 116 (H) mg/dL (calc)    SL AMB CHOL/HDLC RATIO 4 5 <5 0 (calc)    SL AMB NON HDL CHOLESTEROL 144 (H) <130 mg/dL (calc)   Comprehensive metabolic panel    Collection Time: 04/19/18  9:45 AM   Result Value Ref Range    SL AMB GLUCOSE 115 (H) 65 - 99 mg/dL    BUN 26 (H) 7 - 25 mg/dL    Creatinine, Serum 0 53 (L) 0 60 - 0 88 mg/dL    eGFR Non  88 > OR = 60 mL/min/1 73m2    SL AMB EGFR  102 > OR = 60 mL/min/1 73m2    SL AMB BUN/CREATININE RATIO 49 (H) 6 - 22 (calc)    SL AMB SODIUM 143 135 - 146 mmol/L    SL AMB POTASSIUM 4 5 3 5 - 5 3 mmol/L    SL AMB CHLORIDE 105 98 - 110 mmol/L    SL AMB CARBON DIOXIDE 30 20 - 31 mmol/L    SL AMB CALCIUM 9 0 8 6 - 10 4 mg/dL    SL AMB PROTEIN, TOTAL 6 3 6 1 - 8 1 g/dL    Serum Albumin 3 5 (L) 3 6 - 5 1 g/dL    SL AMB GLOBULIN 2 8 1 9 - 3 7 g/dL (calc)    SL AMB ALBUMIN/GLOBULIN RATIO 1 3 1 0 - 2 5 (calc)    SL AMB BILIRUBIN, TOTAL 0 7 0 2 - 1 2 mg/dL    SL AMB ALKALINE PHOSPHATASE 73 33 - 130 U/L    SL AMB AST 13 10 - 35 U/L    SL AMB ALT 14 6 - 29 U/L   CBC and differential    Collection Time: 04/19/18  9:45 AM   Result Value Ref Range    SL AMB LAB WHITE BLOOD CELL COUNT 5 9 3 8 - 10 8 Thousand/uL    SL AMB LAB RED BLOOD CELLS 3 98 3 80 - 5 10 Million/uL    Hemoglobin 12 7 11 7 - 15 5 g/dL    Hematocrit 38 3 35 0 - 45 0 %    MCV 96 2 80 0 - 100 0 fL    MCH 31 9 27 0 - 33 0 pg    MCHC 33 2 32 0 - 36 0 g/dL    RDW 12 6 11 0 - 15 0 %    Platelet Count 798 123 - 400 Thousand/uL    SL AMB MPV 11 7 7 5 - 12 5 fL    Neutrophils (Absolute) 3,593 1,500 - 7,800 cells/uL    Lymphocytes (Absolute) 1,794 850 - 3,900 cells/uL    Monocytes (Absolute) 342 200 - 950 cells/uL    Eosinophils (Absolute) 142 15 - 500 cells/uL    Basophils (Absolute) 30 0 - 200 cells/uL    Neutrophils 60 9 %    Lymphocytes 30 4 %    Monocytes 5 8 %    Eosinophils 2 4 %    Basophils 0 5 %   Hemoglobin A1C With EAG    Collection Time: 04/19/18  9:45 AM   Result Value Ref Range    Hemoglobin A1C 6 6 (H) <5 7 % of total Hgb    Estimated Average Glucose 143 (calc)    Estimated Average Glucose (mmol/L) 7 9 (calc)   Vitamin B12    Collection Time: 04/19/18  9:45 AM   Result Value Ref Range    Vitamin B-12 1,712 (H) 200 - 1,100 pg/mL

## 2018-04-30 NOTE — PROGRESS NOTES
[facility] Clinical     ISAR:   Previous hospitalizations?:  No       Once in a Lifetime Medicare Screening:   EKG performed?:  Yes        Medicare Screening Tests and Risk Assessment:   AAA Risk Assessment    None Indicated:  Yes    Osteoporosis Risk Assessment    :  Yes    Age over 48:  Yes    HIV Risk Assessment    None indicated:  Yes        Drug and Alcohol Use:   Tobacco use    Cigarettes:  never smoker    Tobacco use duration    Tobacco Cessation Readiness    Alcohol use    Alcohol use:  never    Alcohol Treatment Readiness   Illicit Drug Use    Drug use:  never    Drug type:  no sedative use       Diet & Exercise:   Diet   What is your diet?:  Regular, Limited junk food   How many servings a day of the following:   Exercise    Do you currently exercise?:  unable to exercise       Cognitive Impairment Screening:   Anxiety screenings preformed:   Yes Anxiety screen score:  0   Depression screening preformed:  Yes Depression screen score:  0   Depression screening results:  negative for symptoms   Cognitive Impairment Screening    Do you have difficulty learning or retaining new information?:  No Do you have difficulty handling new tasks?:  No   Do you have difficulty with reasoning?:  No Do you have difficulty with spatial ability and orientation?:  No   Do you have difficulty with language?:  No Do you have difficulty with behavior?:  No       Functional Ability/Level of Safety:   Hearing    Hearing difficulties:  Yes    Hearing aid:  No    Hearing Impairment Assessment    Current Activities    Status:  limited ADL's, unlimited IADL's, unlimited social activities   Help needed with the folllowing:    Using the phone:  No Transportation:  Yes   Shopping:  Yes Preparing Meals:  No   Doing Housework:  Yes Doing Laundry:  No   Managing Medications:  No Managing Money:  No   ADL    Fall Risk   Have you fallen in the last 12 months?:  No    Injury History   Polypharmacy:  No Antidepressant Use:  No   Sedative Use: No Antihypertensive Use:  No   Previous Fall:  No Alcohol Use:  No   Deconditioning:  No Visual Impairment:  No   Cogitive Impairment:  No Mmobility Impairment:  No   Postural Hypotension:  No Urinary Incontinence:  No       Home Safety:   Home Safety Risk Factors   Unfamilar with surroundings:  No Uneven floors:  No   Stairs or handrail saftey risk:  No Loose rugs:  No   Household clutter:  No Poor household lighting:  No   No grab bars in bathroom:  No Further evaluation needed:  No       Advanced Directives:   Advanced Directives    Living Will:  No Durable POA for healthcare:  Yes   Patient's End of Life Decisions        Urinary Incontinence:   Do you have urinary incontinence?:  Yes        Glaucoma:             HPI:  Fitz Coreas is a 81 y o  female here for her Subsequent Wellness Visit      Patient Active Problem List   Diagnosis    Diabetes mellitus with polyneuropathy (HCC)    Slow transit constipation    Vitamin B12 deficiency    Venous reflux    Type 2 diabetes mellitus with diabetic polyneuropathy, with long-term current use of insulin (HCC)     Past Medical History:   Diagnosis Date    Pulmonary embolism (HCC)     Thromboembolism (HCC)      Past Surgical History:   Procedure Laterality Date    APPENDECTOMY      CERVICAL FUSION      CHOLECYSTECTOMY      IVC FILTER INSERTION      W/fluorosc angiogr guidance    TONSILLECTOMY      TOTAL ABDOMINAL HYSTERECTOMY W/ BILATERAL SALPINGOOPHORECTOMY      TOTAL HIP ARTHROPLASTY       Family History   Problem Relation Age of Onset    Diabetes Brother     Other Brother      Cardiac disorder     History   Smoking Status    Never Smoker   Smokeless Tobacco    Never Used     History   Alcohol Use    Yes     Comment: Occasional      History   Drug Use No     /80   Pulse 68   Temp 98 4 °F (36 9 °C)   Resp 18   Ht 5' 1" (1 549 m)       Current Outpatient Prescriptions   Medication Sig Dispense Refill    acetaminophen (TYLENOL) 325 mg tablet Take 2 tablets by mouth every 4 (four) hours as needed      AMILoride 5 mg tablet Take 1 tablet by mouth Daily      aspirin (ECOTRIN LOW STRENGTH) 81 mg EC tablet Take 1 tablet by mouth daily      Blood Glucose Monitoring Suppl (ROGER CONTOUR NEXT EZ) w/Device KIT by Does not apply route 2 (two) times a day      furosemide (LASIX) 40 mg tablet Take 1 tablet (40 mg total) by mouth daily 90 tablet 3    insulin aspart (NOVOLOG FLEXPEN) 100 Units/mL SOPN Inject under the skin 2 (two) times a day Sliding scale checked twice daily      repaglinide (PRANDIN) 2 mg tablet Take 2 mg by mouth 3 (three) times a day 2 mg breakfast and lunch, 4 mg dinner       senna-docusate sodium (SENOKOT-S) 8 6-50 mg per tablet Take 2 tablets by mouth daily       No current facility-administered medications for this visit  Allergies   Allergen Reactions    Codeine Edema    Eliquis [Apixaban] Rash    Hydrocodone Edema    Cephalexin     Cough Syrup  [Guaifenesin]     Fluticasone-Salmeterol     Lisinopril     Moxifloxacin     Penicillins        There is no immunization history on file for this patient      Patient Care Team:  Eda Burgos MD as PCP - General      Medicare Screening Tests and Risk Assessments:  [facility] Clinical     ISAR:   Previous hospitalizations?:  No       Once in a Lifetime Medicare Screening:   EKG performed?:  Yes        Medicare Screening Tests and Risk Assessment:   AAA Risk Assessment    None Indicated:  Yes    Osteoporosis Risk Assessment    :  Yes    Age over 48:  Yes    HIV Risk Assessment    None indicated:  Yes        Drug and Alcohol Use:   Tobacco use    Cigarettes:  never smoker    Tobacco use duration    Tobacco Cessation Readiness    Alcohol use    Alcohol use:  never    Alcohol Treatment Readiness   Illicit Drug Use    Drug use:  never    Drug type:  no sedative use       Diet & Exercise:   Diet   What is your diet?:  Regular, Limited junk food   How many servings a day of the following:   Exercise    Do you currently exercise?:  unable to exercise       Cognitive Impairment Screening:   Anxiety screenings preformed:   Yes Anxiety screen score:  0   Depression screening preformed:  Yes Depression screen score:  0   Depression screening results:  negative for symptoms   Cognitive Impairment Screening    Do you have difficulty learning or retaining new information?:  No Do you have difficulty handling new tasks?:  No   Do you have difficulty with reasoning?:  No Do you have difficulty with spatial ability and orientation?:  No   Do you have difficulty with language?:  No Do you have difficulty with behavior?:  No       Functional Ability/Level of Safety:   Hearing    Hearing difficulties:  Yes    Hearing aid:  No    Hearing Impairment Assessment    Current Activities    Status:  limited ADL's, unlimited IADL's, unlimited social activities   Help needed with the folllowing:    Using the phone:  No Transportation:  Yes   Shopping:  Yes Preparing Meals:  No   Doing Housework:  Yes Doing Laundry:  No   Managing Medications:  No Managing Money:  No   ADL    Fall Risk   Have you fallen in the last 12 months?:  No    Injury History   Polypharmacy:  No Antidepressant Use:  No   Sedative Use:  No Antihypertensive Use:  No   Previous Fall:  No Alcohol Use:  No   Deconditioning:  No Visual Impairment:  No   Cogitive Impairment:  No Mmobility Impairment:  No   Postural Hypotension:  No Urinary Incontinence:  No       Home Safety:   Home Safety Risk Factors   Unfamilar with surroundings:  No Uneven floors:  No   Stairs or handrail saftey risk:  No Loose rugs:  No   Household clutter:  No Poor household lighting:  No   No grab bars in bathroom:  No Further evaluation needed:  No       Advanced Directives:   Advanced Directives    Living Will:  No Durable POA for healthcare:  Yes   Patient's End of Life Decisions        Urinary Incontinence:   Do you have urinary incontinence?:  Yes        Glaucoma: Provider Screening    No data filed        No exam data present  Reviewed Updated [facility] Prior Wellness Visits:   Last Medicare wellness visit information was reviewed, patient interviewed , no change since last AWVyes  Last Medicare wellness visit information was reviewed, patient interviewed and updates made to the record today yes    Assessment and Plan:  1  Medicare annual wellness visit, subsequent     2  Diabetic polyneuropathy associated with type 2 diabetes mellitus ([address] )     3  Type 2 diabetes mellitus with diabetic polyneuropathy, with long-term current use of insulin ([facility])  furosemide (LASIX) 40 mg tablet    Comprehensive metabolic panel    Lipid panel    HEMOGLOBIN A1C W/ EAG ESTIMATION   4   Vitamin B12 deficiency         Health Maintenance Due   Topic Date Due    OPHTHALMOLOGY EXAM  03/25/1945    Depression Screening PHQ-9  03/25/1948    DTaP,Tdap,and Td Vaccines (1 - Tdap) 03/25/1957    Fall Risk  03/25/2001    Urinary Incontinence Screening  03/25/2001    PNEUMOCOCCAL POLYSACCHARIDE VACCINE AGE 72 AND OVER  03/25/2001    GLAUCOMA SCREENING 67+ YR  03/25/2003    INFLUENZA VACCINE  09/01/2017    Diabetic Foot Exam  03/07/2018

## 2018-04-30 NOTE — PROGRESS NOTES
Subjective:      Patient ID: Bill Suarez is a 81 y o  female  Chief Complaint   Patient presents with    Follow-up     lab work and blood pressure  [facility]       Here for follow up of DM2  Feels well  Checks glucose regularly and has no low readings, readings below 200 and usually 160 or lower  Daughter has been giving her B12 injections monthly and she is questioning whether she still needs this  Up to date with optho and podiatry comes to her house monthly  Has issues with bilateral great toenails but Dr Yeyo Palacios is following this closely  The following portions of the patient's history were reviewed and updated as appropriate: allergies, current medications, past family history, past medical history, past social history, past surgical history and problem list     Review of Systems   Constitutional: Negative  Respiratory: Negative  Cardiovascular: Negative  Current Outpatient Prescriptions   Medication Sig Dispense Refill    acetaminophen (TYLENOL) 325 mg tablet Take 2 tablets by mouth every 4 (four) hours as needed      AMILoride 5 mg tablet Take 1 tablet by mouth Daily      aspirin (ECOTRIN LOW STRENGTH) 81 mg EC tablet Take 1 tablet by mouth daily      Blood Glucose Monitoring Suppl ([employer] CONTOUR NEXT EZ) w/Device KIT by Does not apply route 2 (two) times a day      furosemide (LASIX) 40 mg tablet Take 1 tablet (40 mg total) by mouth daily 90 tablet 3    insulin aspart (NOVOLOG FLEXPEN) 100 Units/mL SOPN Inject under the skin 2 (two) times a day Sliding scale checked twice daily      repaglinide (PRANDIN) 2 mg tablet Take 2 mg by mouth 3 (three) times a day 2 mg breakfast and lunch, 4 mg dinner       senna-docusate sodium (SENOKOT-S) 8 6-50 mg per tablet Take 2 tablets by mouth daily       No current facility-administered medications for this visit          Objective:    /80   Pulse 68   Temp 98 4 °F (36 9 °C)   Resp 18   Ht 5' 1" (1 549 m) Physical Exam   Constitutional: She is oriented to person, place, and time  She appears well-developed and well-nourished  Eyes: Conjunctivae are normal    Neck: Neck supple  No JVD present  No thyromegaly present  Cardiovascular: Normal rate, regular rhythm, normal heart sounds and intact distal pulses  Exam reveals no gallop and no friction rub  Pulses are no weak pulses  No murmur heard  Pulses:       Dorsalis pedis pulses are 1+ on the right side, and 1+ on the left side  Pulmonary/Chest: Effort normal and breath sounds normal  She has no wheezes  She has no rales  Abdominal: Soft  Bowel sounds are normal  She exhibits no distension  There is no tenderness  Musculoskeletal: She exhibits no edema  In a wheelchair  Bilateral big toes with dressings over toenails  Feet:   Right Foot:   Skin Integrity: Negative for ulcer, skin breakdown, erythema, warmth, callus or dry skin  Left Foot:   Skin Integrity: Negative for ulcer, skin breakdown, erythema, warmth, callus or dry skin  Neurological: She is alert and oriented to person, place, and time  No cranial nerve deficit  Patient's shoes and socks removed  Right Foot/Ankle   Right Foot Inspection  Skin Exam: skin normal and skin intact no dry skin, no warmth, no callus, no erythema, no maceration, no abnormal color, no pre-ulcer, no ulcer and no callus                          Toe Exam: right toe deformity  Sensory     Proprioception: intact   Monofilament testing: diminished  Vascular  Capillary refills: < 3 seconds  The right DP pulse is 1+  Left Foot/Ankle  Left Foot Inspection  Skin Exam: skin normal and skin intactno dry skin, no warmth, no erythema, no maceration, normal color, no pre-ulcer, no ulcer and no callus                         Toe Exam: left toe deformity                   Sensory     Proprioception: intact  Monofilament: diminished  Vascular  Capillary refills: < 3 seconds  The left DP pulse is 1+     Assign Risk Category:  Deformity present; Loss of protective sensation; No weak pulses       Risk: 1    Assessment/Plan:    Vitamin B12 deficiency  Reviewed B12 levels with patient and these are high, will stop B12 injections at home  Type 2 diabetes mellitus with diabetic polyneuropathy, with long-term current use of insulin (HCC)  Continue insulin and prandin at current dosages  A1C was reviewed and is good  Will follow up in 4 months after bloodwork  Eye exam and foot exam current  Diagnoses and all orders for this visit:    Medicare annual wellness visit, subsequent    Diabetic polyneuropathy associated with type 2 diabetes mellitus ([facility] [address] )    Type 2 diabetes mellitus with diabetic polyneuropathy, with long-term current use of insulin (HCC)  -     furosemide (LASIX) 40 mg tablet; Take 1 tablet (40 mg total) by mouth daily  -     Comprehensive metabolic panel; Future  -     Lipid panel; Future  -     HEMOGLOBIN A1C W/ EAG ESTIMATION; Future    Vitamin B12 deficiency    Other orders  -     AMILoride 5 mg tablet; Take 1 tablet by mouth Daily  -     aspirin (ECOTRIN LOW STRENGTH) 81 mg EC tablet; Take 1 tablet by mouth daily  -     Discontinue: cyanocobalamin 1,000 mcg/mL; every 30 (thirty) days An injection monthly per Dr King Hampton  -     Blood Glucose Monitoring Suppl ([employer] CONTOUR NEXT EZ) w/Device KIT; by Does not apply route 2 (two) times a day  -     Discontinue: furosemide (LASIX) 40 mg tablet; Take by mouth  -     insulin aspart (NOVOLOG FLEXPEN) 100 Units/mL SOPN; Inject under the skin 2 (two) times a day Sliding scale checked twice daily  -     repaglinide (PRANDIN) 2 mg tablet; Take 2 mg by mouth 3 (three) times a day 2 mg breakfast and lunch, 4 mg dinner   -     senna-docusate sodium (SENOKOT-S) 8 6-50 mg per tablet; Take 2 tablets by mouth daily  -     acetaminophen (TYLENOL) 325 mg tablet; Take 2 tablets by mouth every 4 (four) hours as needed          Return in about 4 months (around 8/30/2018)  Saman Erickson MD

## 2018-06-04 DIAGNOSIS — E11.42 TYPE 2 DIABETES MELLITUS WITH DIABETIC POLYNEUROPATHY, WITH LONG-TERM CURRENT USE OF INSULIN (HCC): Primary | ICD-10-CM

## 2018-06-04 DIAGNOSIS — I10 ESSENTIAL HYPERTENSION: Primary | ICD-10-CM

## 2018-06-04 DIAGNOSIS — Z79.4 TYPE 2 DIABETES MELLITUS WITH DIABETIC POLYNEUROPATHY, WITH LONG-TERM CURRENT USE OF INSULIN (HCC): Primary | ICD-10-CM

## 2018-06-04 RX ORDER — REPAGLINIDE 2 MG/1
2 TABLET ORAL
Qty: 270 TABLET | Refills: 3 | Status: SHIPPED | OUTPATIENT
Start: 2018-06-04 | End: 2019-05-04 | Stop reason: SDUPTHER

## 2018-06-05 PROBLEM — I10 ESSENTIAL HYPERTENSION: Status: ACTIVE | Noted: 2018-06-05

## 2018-06-05 RX ORDER — AMILORIDE HYDROCHLORIDE 5 MG/1
5 TABLET ORAL DAILY
Qty: 90 TABLET | Refills: 3 | Status: SHIPPED | OUTPATIENT
Start: 2018-06-05 | End: 2019-06-13 | Stop reason: SDUPTHER

## 2018-06-06 ENCOUNTER — TELEPHONE (OUTPATIENT)
Dept: FAMILY MEDICINE CLINIC | Facility: CLINIC | Age: 83
End: 2018-06-06

## 2018-06-06 NOTE — TELEPHONE ENCOUNTER
[facility] called regarding pt script  They have some questions regarding the dosage   Pls call [phone] and use [facility]#[id number]

## 2018-06-06 NOTE — TELEPHONE ENCOUNTER
Spoke with pharmacy regarding pts prandin  They wanted to verify the frequency pt is supposed to be taking this medication  Also the qty was off, she is to be taking it 2 mg at breakfast and 2 mg at lunch and 4 mg in PM  Verbal given to pharmacy   No further action needed Gabi Sandy

## 2018-06-27 ENCOUNTER — IN HOME VISIT (OUTPATIENT)
Dept: PODIATRY | Facility: CLINIC | Age: 83
End: 2018-06-27
Payer: MEDICARE

## 2018-06-27 DIAGNOSIS — E11.42 DIABETIC POLYNEUROPATHY ASSOCIATED WITH TYPE 2 DIABETES MELLITUS (HCC): ICD-10-CM

## 2018-06-27 DIAGNOSIS — I70.209 ARTERIOSCLEROSIS OF ARTERIES OF EXTREMITIES (HCC): ICD-10-CM

## 2018-06-27 DIAGNOSIS — L60.0 INGROWN TOENAIL: ICD-10-CM

## 2018-06-27 DIAGNOSIS — L03.031 CELLULITIS OF GREAT TOE, RIGHT: Primary | ICD-10-CM

## 2018-06-27 PROCEDURE — 99348 HOME/RES VST EST LOW MDM 30: CPT | Performed by: PODIATRIST

## 2018-06-27 RX ORDER — DOXYCYCLINE 100 MG/1
100 TABLET ORAL 2 TIMES DAILY
Qty: 20 TABLET | Refills: 0 | Status: SHIPPED | OUTPATIENT
Start: 2018-06-27 | End: 2018-07-07

## 2018-06-27 NOTE — PROGRESS NOTES
Assessment/Plan:    Under sterile technique in of the heart anesthesia both borders of bilateral hallux nails were removed  Silver nitrate was applied  Patient will finish course of antibiotics  Warm water and Epsom salt soaks Neosporin dressing daily  Patient will call if any increasing signs of infection  Follow-up 2 weeks     Diagnoses and all orders for this visit:    Cellulitis of great toe, right  -     doxycycline (ADOXA) 100 MG tablet; Take 1 tablet (100 mg total) by mouth 2 (two) times a day for 10 days    Diabetic polyneuropathy associated with type 2 diabetes mellitus ([facility] )    Arteriosclerosis of arteries of extremities (HCC)    Ingrown toenail          Subjective:      Patient ID: Osiris Child is a 81 y o  female  Patient is diabetic and homebound  Patient is unable to walk patient is unable to even lift her foot  Patient developed redness and swelling in the big toes right worse than left in the past 3 days  Yesterday patient started taking a course of Bactrim which she had left over  Says that the redness has improved somewhat  Patient has not had any fever or chills          Past Medical History:   Diagnosis Date    Pulmonary embolism ([facility] )     Thromboembolism ([facility] )        Past Surgical History:   Procedure Laterality Date    APPENDECTOMY      CERVICAL FUSION      CHOLECYSTECTOMY      IVC FILTER INSERTION      W/fluorosc angiogr guidance    TONSILLECTOMY      TOTAL ABDOMINAL HYSTERECTOMY W/ BILATERAL SALPINGOOPHORECTOMY      TOTAL HIP ARTHROPLASTY         Allergies   Allergen Reactions    Codeine Edema    Eliquis [Apixaban] Rash    Hydrocodone Edema    Cephalexin     Cough Syrup  [Guaifenesin]     Fluticasone-Salmeterol     Lisinopril     Moxifloxacin     Penicillins          Current Outpatient Prescriptions:     acetaminophen (TYLENOL) 325 mg tablet, Take 2 tablets by mouth every 4 (four) hours as needed, Disp: , Rfl:     AMILoride 5 mg tablet, Take 1 tablet (5 mg total) by mouth daily, Disp: 90 tablet, Rfl: 3    aspirin (ECOTRIN LOW STRENGTH) 81 mg EC tablet, Take 1 tablet by mouth daily, Disp: , Rfl:     Blood Glucose Monitoring Suppl ([employer]VOCATE BLOOD GLUCOSE MONITOR) ARIADNA, by Does not apply route 2 (two) times a day, Disp: 100 each, Rfl: 0    doxycycline (ADOXA) 100 MG tablet, Take 1 tablet (100 mg total) by mouth 2 (two) times a day for 10 days, Disp: 20 tablet, Rfl: 0    furosemide (LASIX) 40 mg tablet, Take 1 tablet (40 mg total) by mouth daily, Disp: 90 tablet, Rfl: 3    insulin aspart (NOVOLOG FLEXPEN) 100 Units/mL SOPN, Sliding Scale  4 Units if sugar is 200-249                         6 Units if sugar is 250-299                        8 Units if sugar is 300-349                       10 Units if sugar is 350-399   Sliding, Disp: 5 pen, Rfl: 0    Insulin Pen Needle (ADVOCATE INSULIN PEN NEEDLES) 31G X 8 MM MISC, by Does not apply route 2 (two) times a day, Disp: 100 each, Rfl: 2    repaglinide (PRANDIN) 2 mg tablet, Take 1 tablet (2 mg total) by mouth 3 (three) times a day 2 mg breakfast and lunch, 4 mg dinner, Disp: 270 tablet, Rfl: 3    senna-docusate sodium (SENOKOT-S) 8 6-50 mg per tablet, Take 2 tablets by mouth daily, Disp: , Rfl:     Patient Active Problem List   Diagnosis    Diabetes mellitus with polyneuropathy (HCC)    Slow transit constipation    Vitamin B12 deficiency    Venous reflux    Type 2 diabetes mellitus with diabetic polyneuropathy, with long-term current use of insulin (HCC)    Essential hypertension       Review of Systems   Constitutional: Negative  HENT: Negative  Eyes: Negative  Respiratory: Negative  Cardiovascular: Negative  Gastrointestinal: Negative  Endocrine: Negative  Genitourinary: Negative  Musculoskeletal: Positive for arthralgias, back pain, gait problem and joint swelling  Skin: Positive for color change  Allergic/Immunologic: Negative  Hematological: Negative  Psychiatric/Behavioral: Negative  Objective: There were no vitals taken for this visit  Physical Exam      Patient's shoes and socks removed  Right Foot/Ankle   Right Foot Inspection    Toe Exam: tenderness        Left Foot/Ankle  Left Foot Inspection                           Toe Exam: tenderness                            Left Foot: Appearance: a deformity  Second toe deformities include hammer toe  Third toe deformities include hammer toe  Forth toe deformities include hammer toe  Fifth toe deformities include hammer toe  Special Tests: +3 pitting edema bilateral feet and ankles edema bilateral legs positive venous stasis negative calf tenderness negative Homans sign  Right Foot: Appearance: a deformity  Great toe deformities include a bunion  Second toe deformities include hammer toe  Third toe deformities include hammer toe  Forth toe deformities include hammer toe  Fifth toe deformities include hammer toe    All nails thick discolored dystrophic, positive subungual debris, positive pain on palpation  Special Tests: right      Neurological Exam: performed  Light touch was decreased bilaterally  Vibratory sensation was absent bilaterally  Response to monofilament test was absent bilaterally  Vascular Exam: performed Dorsalis pedis pulses were absent bilaterally  Posterior tibial pulses were absent bilaterally  Capillary refill time was greater than 3 seconds bilaterally  Edema: severe bilaterally  Toenails: All of the toenails were elongated,-- hypertrophied,-- discolored,-- shown to have subungual debris-- and-- tender  Both first toenails were ingrown  Diminished tactile sensation with monofilament testing throughout the right foot  Diminished tactile sensation with monofilament testing throughout the left foot  Capillary refills findings on the right were delayed in the toes  Pulses:  0 in the posterior tibialis on the right  0 in the dorsalis pedis on the right  Capillary refills findings on the left were delayed in the toes  Pulses:  0 in the posterior tibialis on the left  0 in the dorsalis pedis on the left  Assign Risk Category: 2: Loss of protective sensation with or without weakness, deformity, callus, pre-ulcer, or history of ulceration  High risk  Ingrown nail both borders bilateral hallux  There is pus and redness of right hallux tibial border  No other purulence or abscess  Nails are thickened dystrophic  Reduced pulses bilateral, +2 edema bilateral  Negative calf tenderness negative Homans sign

## 2018-07-09 ENCOUNTER — TELEPHONE (OUTPATIENT)
Dept: FAMILY MEDICINE CLINIC | Facility: CLINIC | Age: 83
End: 2018-07-09

## 2018-08-07 DIAGNOSIS — L97.511 RIGHT FOOT ULCER, LIMITED TO BREAKDOWN OF SKIN (HCC): Primary | ICD-10-CM

## 2018-10-22 ENCOUNTER — TELEPHONE (OUTPATIENT)
Dept: FAMILY MEDICINE CLINIC | Facility: CLINIC | Age: 83
End: 2018-10-22

## 2018-10-22 NOTE — TELEPHONE ENCOUNTER
6 month f/u blood work  Has an apt with Dr Daryn Harden in November  She said we fax bloodwork order to [facility] because they come out to [facility]      GEORGE Lopez

## 2018-11-08 ENCOUNTER — TELEPHONE (OUTPATIENT)
Dept: FAMILY MEDICINE CLINIC | Facility: CLINIC | Age: 83
End: 2018-11-08

## 2018-11-08 NOTE — TELEPHONE ENCOUNTER
DR ROBERTS [facility]    Patient has been waiting since 10/22/2018 for a home draw  There has been nothing  From [employer] at all  Could we look into this for them?

## 2018-11-09 NOTE — TELEPHONE ENCOUNTER
Received confirmation and put confirmation in scanning in the file room  No further action needed  Gabi Bauer

## 2018-11-10 LAB
ALBUMIN SERPL-MCNC: 3.4 G/DL (ref 3.6–5.1)
ALBUMIN/GLOB SERPL: 1.2 (CALC) (ref 1–2.5)
ALP SERPL-CCNC: 72 U/L (ref 33–130)
ALT SERPL-CCNC: 12 U/L (ref 6–29)
AST SERPL-CCNC: 15 U/L (ref 10–35)
BILIRUB SERPL-MCNC: 0.6 MG/DL (ref 0.2–1.2)
BUN SERPL-MCNC: 23 MG/DL (ref 7–25)
BUN/CREAT SERPL: 40 (CALC) (ref 6–22)
CALCIUM SERPL-MCNC: 8.6 MG/DL (ref 8.6–10.4)
CHLORIDE SERPL-SCNC: 104 MMOL/L (ref 98–110)
CHOLEST SERPL-MCNC: 167 MG/DL
CHOLEST/HDLC SERPL: 4 (CALC)
CO2 SERPL-SCNC: 24 MMOL/L (ref 20–32)
CREAT SERPL-MCNC: 0.57 MG/DL (ref 0.6–0.88)
EST. AVERAGE GLUCOSE BLD GHB EST-MCNC: 146 (CALC)
EST. AVERAGE GLUCOSE BLD GHB EST-SCNC: 8.1 (CALC)
GLOBULIN SER CALC-MCNC: 2.8 G/DL (CALC) (ref 1.9–3.7)
GLUCOSE SERPL-MCNC: 82 MG/DL (ref 65–99)
HBA1C MFR BLD: 6.7 % OF TOTAL HGB
HDLC SERPL-MCNC: 42 MG/DL
LDLC SERPL CALC-MCNC: 99 MG/DL (CALC)
NONHDLC SERPL-MCNC: 125 MG/DL (CALC)
POTASSIUM SERPL-SCNC: 4.3 MMOL/L (ref 3.5–5.3)
PROT SERPL-MCNC: 6.2 G/DL (ref 6.1–8.1)
SL AMB EGFR AFRICAN AMERICAN: 100 ML/MIN/1.73M2
SL AMB EGFR NON AFRICAN AMERICAN: 86 ML/MIN/1.73M2
SODIUM SERPL-SCNC: 141 MMOL/L (ref 135–146)
TRIGL SERPL-MCNC: 156 MG/DL

## 2018-11-11 RX ORDER — CLINDAMYCIN HYDROCHLORIDE 300 MG/1
CAPSULE ORAL
Refills: 0 | COMMUNITY
Start: 2018-09-24 | End: 2019-11-04 | Stop reason: ALTCHOICE

## 2018-11-11 NOTE — PROGRESS NOTES
Subjective:      Patient ID: Valentino Ireland is a 81 y o  female  Chief Complaint   Patient presents with    Follow-up     6 month f/u [facility]       Here for follow up of DM and other chronic conditions  Brings in records of home bp monitoring and glucose records  Both are within goal   No hypoglycemic episodes  Glucose for the most part is below 160  Sees a podiatrist and opthalmologist regularly  Has a cold, is congestion, coughing with white sputum, no fever, dyspnea, wheeze  Has been sick for 4 days  No meds tried as yet  Has history of constipation and this is worsening  Has seen Dr Samantha Kim and he recommended a colonoscopy but she does not feel she can do the prep due to the fact that she is wheelchair bound  She does take miralax daily but occasionally has to do a fleets enema  The following portions of the patient's history were reviewed and updated as appropriate: allergies, current medications, past family history, past medical history, past social history, past surgical history and problem list     Review of Systems   Constitutional: Negative  HENT: Positive for congestion  Respiratory: Positive for cough  Negative for shortness of breath and wheezing  Cardiovascular: Negative  Gastrointestinal: Positive for constipation           Current Outpatient Prescriptions   Medication Sig Dispense Refill    acetaminophen (TYLENOL) 325 mg tablet Take 2 tablets by mouth every 4 (four) hours as needed      AMILoride 5 mg tablet Take 1 tablet (5 mg total) by mouth daily 90 tablet 3    aspirin (ECOTRIN LOW STRENGTH) 81 mg EC tablet Take 1 tablet by mouth daily      Blood Glucose Monitoring Suppl (ADVOCATE BLOOD GLUCOSE MONITOR) ARIADNA by Does not apply route 2 (two) times a day 100 each 0    furosemide (LASIX) 40 mg tablet Take 1 tablet (40 mg total) by mouth daily 90 tablet 3    insulin aspart (NOVOLOG FLEXPEN) 100 Units/mL SOPN Sliding Scale  4 Units if sugar is 200-249 6 Units if sugar is 250-299                         8 Units if sugar is 300-349                        10 Units if sugar is 350-399   Sliding 5 pen 0    Insulin Pen Needle (ADVOCATE INSULIN PEN NEEDLES) 31G X 8 MM MISC by Does not apply route 2 (two) times a day 180 each 3    polyethylene glycol (MIRALAX) 17 g packet Take 17 g by mouth daily      repaglinide (PRANDIN) 2 mg tablet Take 1 tablet (2 mg total) by mouth 3 (three) times a day 2 mg breakfast and lunch, 4 mg dinner 270 tablet 3    senna-docusate sodium (SENOKOT-S) 8 6-50 mg per tablet Take 2 tablets by mouth daily      clindamycin (CLEOCIN) 300 MG capsule   0    mupirocin (BACTROBAN) 2 % ointment Apply topically 3 (three) times a day (Patient not taking: Reported on 11/12/2018 ) 30 g 2    sulfamethoxazole-trimethoprim (BACTRIM DS) 800-160 mg per tablet Take 1 tablet by mouth every 12 (twelve) hours for 10 days 20 tablet 0     No current facility-administered medications for this visit  Objective:    /70   Pulse 56   Temp (!) 97 °F (36 1 °C)   Resp 16   Ht 5' 1" (1 549 m)   BMI 45 35 kg/m²        Physical Exam   Constitutional: She appears well-developed and well-nourished  HENT:   Right Ear: Tympanic membrane is retracted  Left Ear: Tympanic membrane is retracted  Nose: Mucosal edema and rhinorrhea present  Eyes: Conjunctivae are normal    Neck: Neck supple  No JVD present  No thyromegaly present  Cardiovascular: Normal rate, regular rhythm, normal heart sounds and intact distal pulses  Exam reveals no gallop and no friction rub  No murmur heard  Pulmonary/Chest: Effort normal and breath sounds normal  She has no wheezes  She has no rales  Abdominal: Soft  Bowel sounds are normal  She exhibits no distension  There is no tenderness  Musculoskeletal: She exhibits no edema     Neurological:   In a wheelchair         Assessment/Plan:    Slow transit constipation  This is somewhat worsened, but is chronic  She refuses GI re-evaluation because she does not want a colonoscopy  She will add fiber in the form of fibercon or citrucel daily to her miralax  Recommended dulcolax prn  If not improving she will call  Type 2 diabetes mellitus with diabetic polyneuropathy, with long-term current use of insulin ([facility])  Lab Results   Component Value Date    HGBA1C 6 7 (H) 11/09/2018       No results for input(s): POCGLU in the last 72 hours  Blood Sugar Average: Last 72 hrs:     Reviewed bloodwork with patient and her daughter  DM is well controlled, will continue current regimen  Discussed good foot and eye care  Essential hypertension  Well controlled on current medications  Diagnoses and all orders for this visit:    Slow transit constipation    Type 2 diabetes mellitus with diabetic polyneuropathy, with long-term current use of insulin ([facility])  -     CBC and differential; Future  -     Comprehensive metabolic panel; Future  -     Lipid panel; Future  -     HEMOGLOBIN A1C W/ EAG ESTIMATION; Future  -     Insulin Pen Needle (ADVOCATE INSULIN PEN NEEDLES) 31G X 8 MM MISC; by Does not apply route 2 (two) times a day    Essential hypertension  -     CBC and differential; Future  -     Comprehensive metabolic panel; Future  -     Lipid panel; Future  -     HEMOGLOBIN A1C W/ EAG ESTIMATION; Future    Acute non-recurrent sinusitis, unspecified location  Comments: Will treat with bactrim as above, discussed supportive care  Follow up if not improving  Orders:  -     sulfamethoxazole-trimethoprim (BACTRIM DS) 800-160 mg per tablet; Take 1 tablet by mouth every 12 (twelve) hours for 10 days    Other orders  -     clindamycin (CLEOCIN) 300 MG capsule;   -     polyethylene glycol (MIRALAX) 17 g packet; Take 17 g by mouth daily          Return in about 6 months (around 5/12/2019)         Tashia Forte MD

## 2018-11-12 ENCOUNTER — OFFICE VISIT (OUTPATIENT)
Dept: FAMILY MEDICINE CLINIC | Facility: CLINIC | Age: 83
End: 2018-11-12
Payer: MEDICARE

## 2018-11-12 VITALS
BODY MASS INDEX: 45.35 KG/M2 | DIASTOLIC BLOOD PRESSURE: 70 MMHG | HEIGHT: 61 IN | TEMPERATURE: 97 F | HEART RATE: 56 BPM | SYSTOLIC BLOOD PRESSURE: 110 MMHG | RESPIRATION RATE: 16 BRPM

## 2018-11-12 DIAGNOSIS — E11.42 TYPE 2 DIABETES MELLITUS WITH DIABETIC POLYNEUROPATHY, WITH LONG-TERM CURRENT USE OF INSULIN (HCC): ICD-10-CM

## 2018-11-12 DIAGNOSIS — I10 ESSENTIAL HYPERTENSION: ICD-10-CM

## 2018-11-12 DIAGNOSIS — J01.90 ACUTE NON-RECURRENT SINUSITIS, UNSPECIFIED LOCATION: ICD-10-CM

## 2018-11-12 DIAGNOSIS — Z79.4 TYPE 2 DIABETES MELLITUS WITH DIABETIC POLYNEUROPATHY, WITH LONG-TERM CURRENT USE OF INSULIN (HCC): ICD-10-CM

## 2018-11-12 DIAGNOSIS — K59.01 SLOW TRANSIT CONSTIPATION: Primary | ICD-10-CM

## 2018-11-12 PROCEDURE — 99214 OFFICE O/P EST MOD 30 MIN: CPT | Performed by: INTERNAL MEDICINE

## 2018-11-12 RX ORDER — POLYETHYLENE GLYCOL 3350 17 G/17G
17 POWDER, FOR SOLUTION ORAL DAILY
COMMUNITY
End: 2021-01-18 | Stop reason: ALTCHOICE

## 2018-11-12 RX ORDER — SULFAMETHOXAZOLE AND TRIMETHOPRIM 800; 160 MG/1; MG/1
1 TABLET ORAL EVERY 12 HOURS SCHEDULED
Qty: 20 TABLET | Refills: 0 | Status: SHIPPED | OUTPATIENT
Start: 2018-11-12 | End: 2018-11-22

## 2018-11-12 NOTE — PATIENT INSTRUCTIONS
Please continue miralax every day  Add fibercon or citrucel once day, if after a week there is no improvement you can take the fiber twice a week

## 2018-11-12 NOTE — ASSESSMENT & PLAN NOTE
Lab Results   Component Value Date    HGBA1C 6 7 (H) 11/09/2018       No results for input(s): POCGLU in the last 72 hours  Blood Sugar Average: Last 72 hrs:     Reviewed bloodwork with patient and her daughter  DM is well controlled, will continue current regimen  Discussed good foot and eye care

## 2018-11-12 NOTE — ASSESSMENT & PLAN NOTE
This is somewhat worsened, but is chronic  She refuses GI re-evaluation because she does not want a colonoscopy  She will add fiber in the form of fibercon or citrucel daily to her miralax  Recommended dulcolax prn  If not improving she will call

## 2018-12-26 LAB
LEFT EYE DIABETIC RETINOPATHY: NORMAL
RIGHT EYE DIABETIC RETINOPATHY: NORMAL
SEVERITY (EYE EXAM): NORMAL

## 2019-04-04 DIAGNOSIS — E11.42 TYPE 2 DIABETES MELLITUS WITH DIABETIC POLYNEUROPATHY, WITH LONG-TERM CURRENT USE OF INSULIN (HCC): ICD-10-CM

## 2019-04-04 DIAGNOSIS — Z79.4 TYPE 2 DIABETES MELLITUS WITH DIABETIC POLYNEUROPATHY, WITH LONG-TERM CURRENT USE OF INSULIN (HCC): ICD-10-CM

## 2019-04-04 RX ORDER — FUROSEMIDE 40 MG/1
TABLET ORAL
Qty: 90 TABLET | Refills: 3 | Status: SHIPPED | OUTPATIENT
Start: 2019-04-04 | End: 2020-04-29

## 2019-05-04 DIAGNOSIS — E11.42 TYPE 2 DIABETES MELLITUS WITH DIABETIC POLYNEUROPATHY, WITH LONG-TERM CURRENT USE OF INSULIN (HCC): ICD-10-CM

## 2019-05-04 DIAGNOSIS — Z79.4 TYPE 2 DIABETES MELLITUS WITH DIABETIC POLYNEUROPATHY, WITH LONG-TERM CURRENT USE OF INSULIN (HCC): ICD-10-CM

## 2019-05-04 RX ORDER — REPAGLINIDE 2 MG/1
TABLET ORAL
Qty: 360 TABLET | Refills: 3 | Status: SHIPPED | OUTPATIENT
Start: 2019-05-04 | End: 2020-05-22

## 2019-05-06 DIAGNOSIS — Z79.4 TYPE 2 DIABETES MELLITUS WITH DIABETIC POLYNEUROPATHY, WITH LONG-TERM CURRENT USE OF INSULIN (HCC): ICD-10-CM

## 2019-05-06 DIAGNOSIS — E11.42 TYPE 2 DIABETES MELLITUS WITH DIABETIC POLYNEUROPATHY, WITH LONG-TERM CURRENT USE OF INSULIN (HCC): ICD-10-CM

## 2019-05-11 ENCOUNTER — OFFICE VISIT (OUTPATIENT)
Dept: FAMILY MEDICINE CLINIC | Facility: CLINIC | Age: 84
End: 2019-05-11
Payer: COMMERCIAL

## 2019-05-11 ENCOUNTER — APPOINTMENT (OUTPATIENT)
Dept: RADIOLOGY | Facility: CLINIC | Age: 84
End: 2019-05-11
Payer: COMMERCIAL

## 2019-05-11 VITALS — HEART RATE: 84 BPM | DIASTOLIC BLOOD PRESSURE: 62 MMHG | SYSTOLIC BLOOD PRESSURE: 118 MMHG | TEMPERATURE: 95.9 F

## 2019-05-11 DIAGNOSIS — Z98.1 H/O SPINAL FUSION: ICD-10-CM

## 2019-05-11 DIAGNOSIS — M54.2 CERVICALGIA: ICD-10-CM

## 2019-05-11 DIAGNOSIS — M54.2 CERVICALGIA: Primary | ICD-10-CM

## 2019-05-11 PROCEDURE — 72050 X-RAY EXAM NECK SPINE 4/5VWS: CPT

## 2019-05-11 PROCEDURE — 99213 OFFICE O/P EST LOW 20 MIN: CPT | Performed by: FAMILY MEDICINE

## 2019-05-11 RX ORDER — CYCLOBENZAPRINE HCL 10 MG
10 TABLET ORAL
Qty: 21 TABLET | Refills: 0 | Status: SHIPPED | OUTPATIENT
Start: 2019-05-11 | End: 2019-11-04 | Stop reason: ALTCHOICE

## 2019-05-14 ENCOUNTER — TELEPHONE (OUTPATIENT)
Dept: FAMILY MEDICINE CLINIC | Facility: CLINIC | Age: 84
End: 2019-05-14

## 2019-05-14 DIAGNOSIS — E53.8 VITAMIN B12 DEFICIENCY: ICD-10-CM

## 2019-05-14 DIAGNOSIS — E11.42 DIABETIC POLYNEUROPATHY ASSOCIATED WITH TYPE 2 DIABETES MELLITUS (HCC): Primary | ICD-10-CM

## 2019-05-14 DIAGNOSIS — I10 ESSENTIAL HYPERTENSION: ICD-10-CM

## 2019-05-25 LAB
ALBUMIN SERPL-MCNC: 3.4 G/DL (ref 3.6–5.1)
ALBUMIN/GLOB SERPL: 1.1 (CALC) (ref 1–2.5)
ALP SERPL-CCNC: 66 U/L (ref 33–130)
ALT SERPL-CCNC: 19 U/L (ref 6–29)
AST SERPL-CCNC: 18 U/L (ref 10–35)
BASOPHILS # BLD AUTO: 50 CELLS/UL (ref 0–200)
BASOPHILS NFR BLD AUTO: 0.8 %
BILIRUB SERPL-MCNC: 0.7 MG/DL (ref 0.2–1.2)
BUN SERPL-MCNC: 29 MG/DL (ref 7–25)
BUN/CREAT SERPL: 53 (CALC) (ref 6–22)
CALCIUM SERPL-MCNC: 9.2 MG/DL (ref 8.6–10.4)
CHLORIDE SERPL-SCNC: 106 MMOL/L (ref 98–110)
CHOLEST SERPL-MCNC: 192 MG/DL
CHOLEST/HDLC SERPL: 5.1 (CALC)
CO2 SERPL-SCNC: 26 MMOL/L (ref 20–32)
CREAT SERPL-MCNC: 0.55 MG/DL (ref 0.6–0.88)
EOSINOPHIL # BLD AUTO: 167 CELLS/UL (ref 15–500)
EOSINOPHIL NFR BLD AUTO: 2.7 %
ERYTHROCYTE [DISTWIDTH] IN BLOOD BY AUTOMATED COUNT: 12.9 % (ref 11–15)
EST. AVERAGE GLUCOSE BLD GHB EST-MCNC: 148 (CALC)
EST. AVERAGE GLUCOSE BLD GHB EST-SCNC: 8.2 (CALC)
GLOBULIN SER CALC-MCNC: 3 G/DL (CALC) (ref 1.9–3.7)
GLUCOSE SERPL-MCNC: 104 MG/DL (ref 65–99)
HBA1C MFR BLD: 6.8 % OF TOTAL HGB
HCT VFR BLD AUTO: 38 % (ref 35–45)
HDLC SERPL-MCNC: 38 MG/DL
HGB BLD-MCNC: 12.8 G/DL (ref 11.7–15.5)
LDLC SERPL CALC-MCNC: 126 MG/DL (CALC)
LYMPHOCYTES # BLD AUTO: 1959 CELLS/UL (ref 850–3900)
LYMPHOCYTES NFR BLD AUTO: 31.6 %
MCH RBC QN AUTO: 31 PG (ref 27–33)
MCHC RBC AUTO-ENTMCNC: 33.7 G/DL (ref 32–36)
MCV RBC AUTO: 92 FL (ref 80–100)
MONOCYTES # BLD AUTO: 347 CELLS/UL (ref 200–950)
MONOCYTES NFR BLD AUTO: 5.6 %
NEUTROPHILS # BLD AUTO: 3677 CELLS/UL (ref 1500–7800)
NEUTROPHILS NFR BLD AUTO: 59.3 %
NONHDLC SERPL-MCNC: 154 MG/DL (CALC)
PLATELET # BLD AUTO: 181 THOUSAND/UL (ref 140–400)
PMV BLD REES-ECKER: 11.6 FL (ref 7.5–12.5)
POTASSIUM SERPL-SCNC: 4.2 MMOL/L (ref 3.5–5.3)
PROT SERPL-MCNC: 6.4 G/DL (ref 6.1–8.1)
RBC # BLD AUTO: 4.13 MILLION/UL (ref 3.8–5.1)
SL AMB EGFR AFRICAN AMERICAN: 101 ML/MIN/1.73M2
SL AMB EGFR NON AFRICAN AMERICAN: 87 ML/MIN/1.73M2
SODIUM SERPL-SCNC: 141 MMOL/L (ref 135–146)
TRIGL SERPL-MCNC: 168 MG/DL
WBC # BLD AUTO: 6.2 THOUSAND/UL (ref 3.8–10.8)

## 2019-05-28 LAB — VIT B12 SERPL-MCNC: 321 PG/ML (ref 200–1100)

## 2019-06-03 ENCOUNTER — OFFICE VISIT (OUTPATIENT)
Dept: FAMILY MEDICINE CLINIC | Facility: CLINIC | Age: 84
End: 2019-06-03
Payer: COMMERCIAL

## 2019-06-03 VITALS
TEMPERATURE: 97.4 F | DIASTOLIC BLOOD PRESSURE: 78 MMHG | SYSTOLIC BLOOD PRESSURE: 118 MMHG | RESPIRATION RATE: 18 BRPM | HEART RATE: 68 BPM

## 2019-06-03 DIAGNOSIS — E11.42 TYPE 2 DIABETES MELLITUS WITH DIABETIC POLYNEUROPATHY, WITH LONG-TERM CURRENT USE OF INSULIN (HCC): ICD-10-CM

## 2019-06-03 DIAGNOSIS — N39.0 RECURRENT UTI: ICD-10-CM

## 2019-06-03 DIAGNOSIS — E11.42 DIABETIC POLYNEUROPATHY ASSOCIATED WITH TYPE 2 DIABETES MELLITUS (HCC): ICD-10-CM

## 2019-06-03 DIAGNOSIS — I10 ESSENTIAL HYPERTENSION: ICD-10-CM

## 2019-06-03 DIAGNOSIS — E53.8 VITAMIN B12 DEFICIENCY: ICD-10-CM

## 2019-06-03 DIAGNOSIS — Z00.00 MEDICARE ANNUAL WELLNESS VISIT, SUBSEQUENT: Primary | ICD-10-CM

## 2019-06-03 DIAGNOSIS — Z79.4 TYPE 2 DIABETES MELLITUS WITH DIABETIC POLYNEUROPATHY, WITH LONG-TERM CURRENT USE OF INSULIN (HCC): ICD-10-CM

## 2019-06-03 PROBLEM — L97.511 RIGHT FOOT ULCER, LIMITED TO BREAKDOWN OF SKIN (HCC): Status: ACTIVE | Noted: 2019-06-03

## 2019-06-03 PROBLEM — I70.209 ARTERIOSCLEROSIS OF ARTERIES OF EXTREMITIES (HCC): Status: ACTIVE | Noted: 2019-06-03

## 2019-06-03 PROCEDURE — 99214 OFFICE O/P EST MOD 30 MIN: CPT | Performed by: INTERNAL MEDICINE

## 2019-06-03 PROCEDURE — G0439 PPPS, SUBSEQ VISIT: HCPCS | Performed by: INTERNAL MEDICINE

## 2019-06-03 PROCEDURE — 3078F DIAST BP <80 MM HG: CPT | Performed by: INTERNAL MEDICINE

## 2019-06-03 PROCEDURE — 1125F AMNT PAIN NOTED PAIN PRSNT: CPT | Performed by: INTERNAL MEDICINE

## 2019-06-03 PROCEDURE — 3074F SYST BP LT 130 MM HG: CPT | Performed by: INTERNAL MEDICINE

## 2019-06-03 PROCEDURE — 1101F PT FALLS ASSESS-DOCD LE1/YR: CPT | Performed by: INTERNAL MEDICINE

## 2019-06-03 PROCEDURE — 1160F RVW MEDS BY RX/DR IN RCRD: CPT | Performed by: INTERNAL MEDICINE

## 2019-06-03 PROCEDURE — 1036F TOBACCO NON-USER: CPT | Performed by: INTERNAL MEDICINE

## 2019-06-03 PROCEDURE — 1170F FXNL STATUS ASSESSED: CPT | Performed by: INTERNAL MEDICINE

## 2019-06-03 RX ORDER — SULFAMETHOXAZOLE AND TRIMETHOPRIM 800; 160 MG/1; MG/1
1 TABLET ORAL EVERY 12 HOURS SCHEDULED
Qty: 14 TABLET | Refills: 3 | Status: SHIPPED | OUTPATIENT
Start: 2019-06-03 | End: 2020-01-19

## 2019-06-13 DIAGNOSIS — I10 ESSENTIAL HYPERTENSION: ICD-10-CM

## 2019-06-13 RX ORDER — AMILORIDE HYDROCHLORIDE 5 MG/1
TABLET ORAL
Qty: 90 TABLET | Refills: 3 | Status: SHIPPED | OUTPATIENT
Start: 2019-06-13 | End: 2020-05-22

## 2019-09-03 ENCOUNTER — TELEPHONE (OUTPATIENT)
Dept: FAMILY MEDICINE CLINIC | Facility: CLINIC | Age: 84
End: 2019-09-03

## 2019-09-03 NOTE — TELEPHONE ENCOUNTER
Monica Durant needs a script for Elli Marie for a Wheelchair Evaluation        Fax to Elli Marie at [phone]    Any questions, call granddaughter Kizzyrobertlam Traci at [phone]

## 2019-09-04 PROBLEM — R26.9 GAIT ABNORMALITY: Status: ACTIVE | Noted: 2019-09-04

## 2019-10-31 NOTE — PROGRESS NOTES
Subjective:      Patient ID: Kaela Venegas is a 81 y o  female  Chief Complaint   Patient presents with    Other     wheel chair  vfrma  Medicare Wellness Visit       Here for face to face for a new wheelchair  She and her daughter have been to the wheelchair clinic at [facility]  She needs to have a motorized wheelchair  She is completely wheelchair bound, uses a kai to get into chair or to bed to change  Not ambulatory at all  No ADL's without wheelchair  She cannot transfer without her wheelchair and kai  She is unable to propel the wheelchair on her own but does well with the mechanized wheelchair  She cannot meet mobility needs without her wheelchair because she is totally non ambulatory, she cannot complete any ADL's or IADL's without the wheelchair  She is unable to use any other assistive devices such as cane or walker because she is completely nonambulatory, and has not been able to use these other devices in years  Also feels her diabetic neuropathy is worsening  Had tried gabapentin in the past but for a different reason and does not recall if this helped her symptoms at all  She does not recall any side effects  Tries to follow a sensible diet but not able to exercise at all  Up to date with eye exam        The following portions of the patient's history were reviewed and updated as appropriate: allergies, current medications, past family history, past medical history, past social history, past surgical history and problem list     Review of Systems   Constitutional: Negative  Respiratory: Negative  Cardiovascular: Negative  Neurological: Positive for numbness          Limb pain bilaterally         Current Outpatient Medications   Medication Sig Dispense Refill    acetaminophen (TYLENOL) 325 mg tablet Take 2 tablets by mouth every 4 (four) hours as needed      AMILoride 5 mg tablet TAKE 1 TABLET BY MOUTH  DAILY 90 tablet 3    aspirin (ECOTRIN LOW STRENGTH) 81 mg EC tablet Take 1 tablet by mouth daily      betamethasone valerate (VALISONE) 0 1 % cream Apply topically 2 (two) times a day 15 g 2    Blood Glucose Monitoring Suppl (ADVOCATE BLOOD GLUCOSE MONITOR) ARIADNA by Does not apply route 2 (two) times a day 100 each 0    furosemide (LASIX) 40 mg tablet TAKE 1 TABLET BY MOUTH  DAILY 90 tablet 3    insulin aspart (NOVOLOG FLEXPEN) 100 Units/mL injection pen INJECT 2-10 UNITS TWICE DAILY AS NEEDED AS DIRECTED PER SLIDING SCALE 1 mL 2    Insulin Pen Needle (ADVOCATE INSULIN PEN NEEDLES) 31G X 8 MM MISC by Does not apply route 2 (two) times a day 180 each 3    polyethylene glycol (MIRALAX) 17 g packet Take 17 g by mouth daily      repaglinide (PRANDIN) 2 mg tablet TAKE 1 TABLET BY MOUTH AT  BREAKFAST, 1 TABLET AT  LUNCH, AND 2 TABLETS AT  DINNER 360 tablet 3    senna-docusate sodium (SENOKOT-S) 8 6-50 mg per tablet Take 2 tablets by mouth daily      sulfamethoxazole-trimethoprim (BACTRIM DS) 800-160 mg per tablet   3    gabapentin (NEURONTIN) 300 mg capsule Take 1 capsule (300 mg total) by mouth daily at bedtime 30 capsule 1    mupirocin (BACTROBAN) 2 % ointment Apply topically 3 (three) times a day (Patient not taking: Reported on 11/4/2019) 30 g 2     No current facility-administered medications for this visit  Objective:    /60   Pulse 55   Temp 98 1 °F (36 7 °C)   Resp 18   Ht 5' 2" (1 575 m) Comment: wheelchair  Wt 117 kg (257 lb) Comment: wheelchair  SpO2 94%   BMI 47 01 kg/m²        Physical Exam   Constitutional: She appears well-developed and well-nourished  Eyes: Conjunctivae are normal    Neck: Neck supple  No JVD present  No thyromegaly present  Cardiovascular: Normal rate, regular rhythm, normal heart sounds and intact distal pulses  Exam reveals no gallop and no friction rub  No murmur heard  Pulmonary/Chest: Effort normal and breath sounds normal  She has no wheezes  She has no rales  Abdominal: Soft   Bowel sounds are normal  She exhibits no distension  There is no tenderness  Musculoskeletal: She exhibits edema  Completely wheelchair bound  Not able to ambulate  Assessment/Plan:    Diabetes mellitus with polyneuropathy ([address] )    Lab Results   Component Value Date    HGBA1C 6 8 (H) 05/24/2019     This has been well controlled and she will continue her current medications  She is due for labwork next month and this was ordered  After a lengthy discussion about possible side effects, we will try low dose gabapentin at HS for her neuropathy symptoms and titrate as necessary  Asked daughter to call in one week with an update on her symptoms and efficacy of her gabapentin  Discussed possible side effects  Essential hypertension  This is well controlled and she will continue her medications  Gait abnormality  She is completely wheelchair bound and she and her daughter were told we will fill out any paperwork necessary for the wheelchair clinic  BMI Counseling: Body mass index is 47 01 kg/m²  The BMI is above normal  Nutrition recommendations include reducing portion sizes  Diagnoses and all orders for this visit:    Type 2 diabetes mellitus with diabetic polyneuropathy, with long-term current use of insulin (HCC)  -     CBC and differential; Future  -     Comprehensive metabolic panel; Future  -     Lipid panel; Future  -     HEMOGLOBIN A1C W/ EAG ESTIMATION; Future  -     CBC and differential  -     Comprehensive metabolic panel  -     Lipid panel    Diabetic polyneuropathy associated with type 2 diabetes mellitus (HCC)  -     gabapentin (NEURONTIN) 300 mg capsule; Take 1 capsule (300 mg total) by mouth daily at bedtime    Essential hypertension  -     CBC and differential; Future  -     Comprehensive metabolic panel; Future  -     Lipid panel; Future  -     HEMOGLOBIN A1C W/ EAG ESTIMATION;  Future  -     CBC and differential  -     Comprehensive metabolic panel  -     Lipid panel    Gait abnormality    BMI 45 0-49 9, adult ([facility] [address] )          Return in about 6 months (around 5/4/2020)         [place] MD Mauricio

## 2019-11-04 ENCOUNTER — OFFICE VISIT (OUTPATIENT)
Dept: FAMILY MEDICINE CLINIC | Facility: CLINIC | Age: 84
End: 2019-11-04
Payer: COMMERCIAL

## 2019-11-04 ENCOUNTER — TELEPHONE (OUTPATIENT)
Dept: FAMILY MEDICINE CLINIC | Facility: CLINIC | Age: 84
End: 2019-11-04

## 2019-11-04 VITALS
OXYGEN SATURATION: 94 % | HEART RATE: 55 BPM | BODY MASS INDEX: 47.29 KG/M2 | DIASTOLIC BLOOD PRESSURE: 60 MMHG | SYSTOLIC BLOOD PRESSURE: 128 MMHG | WEIGHT: 257 LBS | HEIGHT: 62 IN | TEMPERATURE: 98.1 F | RESPIRATION RATE: 18 BRPM

## 2019-11-04 DIAGNOSIS — E11.42 DIABETIC POLYNEUROPATHY ASSOCIATED WITH TYPE 2 DIABETES MELLITUS (HCC): ICD-10-CM

## 2019-11-04 DIAGNOSIS — E11.42 TYPE 2 DIABETES MELLITUS WITH DIABETIC POLYNEUROPATHY, WITH LONG-TERM CURRENT USE OF INSULIN (HCC): Primary | ICD-10-CM

## 2019-11-04 DIAGNOSIS — R26.9 GAIT ABNORMALITY: ICD-10-CM

## 2019-11-04 DIAGNOSIS — Z79.4 TYPE 2 DIABETES MELLITUS WITH DIABETIC POLYNEUROPATHY, WITH LONG-TERM CURRENT USE OF INSULIN (HCC): Primary | ICD-10-CM

## 2019-11-04 DIAGNOSIS — I10 ESSENTIAL HYPERTENSION: ICD-10-CM

## 2019-11-04 PROCEDURE — 3074F SYST BP LT 130 MM HG: CPT | Performed by: INTERNAL MEDICINE

## 2019-11-04 PROCEDURE — 1170F FXNL STATUS ASSESSED: CPT | Performed by: INTERNAL MEDICINE

## 2019-11-04 PROCEDURE — 3078F DIAST BP <80 MM HG: CPT | Performed by: INTERNAL MEDICINE

## 2019-11-04 PROCEDURE — 1125F AMNT PAIN NOTED PAIN PRSNT: CPT | Performed by: INTERNAL MEDICINE

## 2019-11-04 PROCEDURE — 99213 OFFICE O/P EST LOW 20 MIN: CPT | Performed by: INTERNAL MEDICINE

## 2019-11-04 PROCEDURE — G0439 PPPS, SUBSEQ VISIT: HCPCS | Performed by: INTERNAL MEDICINE

## 2019-11-04 RX ORDER — GABAPENTIN 300 MG/1
300 CAPSULE ORAL
Qty: 30 CAPSULE | Refills: 1 | Status: SHIPPED | OUTPATIENT
Start: 2019-11-04 | End: 2020-06-15

## 2019-11-04 NOTE — PATIENT INSTRUCTIONS
Medicare Preventive Visit Patient Instructions  Thank you for completing your Welcome to Medicare Visit or Medicare Annual Wellness Visit today  Your next wellness visit will be due in one year (11/4/2020)  The screening/preventive services that you may require over the next 5-10 years are detailed below  Some tests may not apply to you based off risk factors and/or age  Screening tests ordered at today's visit but not completed yet may show as past due  Also, please note that scanned in results may not display below  Preventive Screenings:  Service Recommendations Previous Testing/Comments   Colorectal Cancer Screening  * Colonoscopy    * Fecal Occult Blood Test (FOBT)/Fecal Immunochemical Test (FIT)  * Fecal DNA/Cologuard Test  * Flexible Sigmoidoscopy Age: 54-65 years old   Colonoscopy: every 10 years (may be performed more frequently if at higher risk)  OR  FOBT/FIT: every 1 year  OR  Cologuard: every 3 years  OR  Sigmoidoscopy: every 5 years  Screening may be recommended earlier than age 48 if at higher risk for colorectal cancer  Also, an individualized decision between you and your healthcare provider will decide whether screening between the ages of 74-80 would be appropriate  Colonoscopy: Not on file  FOBT/FIT: Not on file  Cologuard: Not on file  Sigmoidoscopy: Not on file         Breast Cancer Screening Age: 36 years old  Frequency: every 1-2 years  Not required if history of left and right mastectomy Mammogram: Not on file       Cervical Cancer Screening Between the ages of 21-29, pap smear recommended once every 3 years  Between the ages of 33-67, can perform pap smear with HPV co-testing every 5 years     Recommendations may differ for women with a history of total hysterectomy, cervical cancer, or abnormal pap smears in past  Pap Smear: Not on file    Screening Not Indicated   Hepatitis C Screening Once for adults born between [facility]  More frequently in patients at high risk for Hepatitis C Hep C Antibody: Not on file       Diabetes Screening 1-2 times per year if you're at risk for diabetes or have pre-diabetes Fasting glucose: No results in last 5 years   A1C: 6 8 % of total Hgb    Screening Not Indicated  History Diabetes   Cholesterol Screening Once every 5 years if you don't have a lipid disorder  May order more often based on risk factors  Lipid panel: 05/24/2019    Screening Current     Other Preventive Screenings Covered by Medicare:  1  Abdominal Aortic Aneurysm (AAA) Screening: covered once if your at risk  You're considered to be at risk if you have a family history of AAA  2  Lung Cancer Screening: covers low dose CT scan once per year if you meet all of the following conditions: (1) Age 50-69; (2) No signs or symptoms of lung cancer; (3) Current smoker or have quit smoking within the last 15 years; (4) You have a tobacco smoking history of at least 30 pack years (packs per day multiplied by number of years you smoked); (5) You get a written order from a healthcare provider  3  Glaucoma Screening: covered annually if you're considered high risk: (1) You have diabetes OR (2) Family history of glaucoma OR (3)  aged 48 and older OR (3)  American aged 72 and older  3  Osteoporosis Screening: covered every 2 years if you meet one of the following conditions: (1) You're estrogen deficient and at risk for osteoporosis based off medical history and other findings; (2) Have a vertebral abnormality; (3) On glucocorticoid therapy for more than 3 months; (4) Have primary hyperparathyroidism; (5) On osteoporosis medications and need to assess response to drug therapy  · Last bone density test (DXA Scan): Not on file  5  HIV Screening: covered annually if you're between the age of 12-76  Also covered annually if you are younger than 13 and older than 72 with risk factors for HIV infection   For pregnant patients, it is covered up to 3 times per pregnancy  Immunizations:  Immunization Recommendations   Influenza Vaccine Annual influenza vaccination during flu season is recommended for all persons aged >= 6 months who do not have contraindications   Pneumococcal Vaccine (Prevnar and Pneumovax)  * Prevnar = PCV13  * Pneumovax = PPSV23   Adults 25-60 years old: 1-3 doses may be recommended based on certain risk factors  Adults 72 years old: Prevnar (PCV13) vaccine recommended followed by Pneumovax (PPSV23) vaccine  If already received PPSV23 since turning 65, then PCV13 recommended at least one year after PPSV23 dose  Hepatitis B Vaccine 3 dose series if at intermediate or high risk (ex: diabetes, end stage renal disease, liver disease)   Tetanus (Td) Vaccine - COST NOT COVERED BY MEDICARE PART B Following completion of primary series, a booster dose should be given every 10 years to maintain immunity against tetanus  Td may also be given as tetanus wound prophylaxis  Tdap Vaccine - COST NOT COVERED BY MEDICARE PART B Recommended at least once for all adults  For pregnant patients, recommended with each pregnancy  Shingles Vaccine (Shingrix) - COST NOT COVERED BY MEDICARE PART B  2 shot series recommended in those aged 48 and above     Health Maintenance Due:  There are no preventive care reminders to display for this patient  Immunizations Due:      Topic Date Due    HEPATITIS B VACCINES (1 of 3 - Risk 3-dose series) 03/25/1954    DTaP,Tdap,and Td Vaccines (1 - Tdap) 03/25/1957    Pneumococcal Vaccine: 65+ Years (1 of 2 - PCV13) 03/25/2001    INFLUENZA VACCINE  07/01/2019     Advance Directives   What are advance directives? Advance directives are legal documents that state your wishes and plans for medical care  These plans are made ahead of time in case you lose your ability to make decisions for yourself  Advance directives can apply to any medical decision, such as the treatments you want, and if you want to donate organs     What are the types of advance directives? There are many types of advance directives, and each state has rules about how to use them  You may choose a combination of any of the following:  · Living will: This is a written record of the treatment you want  You can also choose which treatments you do not want, which to limit, and which to stop at a certain time  This includes surgery, medicine, IV fluid, and tube feedings  · Durable power of  for healthcare [facility]): This is a written record that states who you want to make healthcare choices for you when you are unable to make them for yourself  This person, called a proxy, is usually a family member or a friend  You may choose more than 1 proxy  · Do not resuscitate (DNR) order:  A DNR order is used in case your heart stops beating or you stop breathing  It is a request not to have certain forms of treatment, such as CPR  A DNR order may be included in other types of advance directives  · Medical directive: This covers the care that you want if you are in a coma, near death, or unable to make decisions for yourself  You can list the treatments you want for each condition  Treatment may include pain medicine, surgery, blood transfusions, dialysis, IV or tube feedings, and a ventilator (breathing machine)  · Values history: This document has questions about your views, beliefs, and how you feel and think about life  This information can help others choose the care that you would choose  Why are advance directives important? An advance directive helps you control your care  Although spoken wishes may be used, it is better to have your wishes written down  Spoken wishes can be misunderstood, or not followed  Treatments may be given even if you do not want them  An advance directive may make it easier for your family to make difficult choices about your care  Urinary Incontinence   Urinary incontinence (UI)  is when you lose control of your bladder   UI develops because your bladder cannot store or empty urine properly  The 3 most common types of UI are stress incontinence, urge incontinence, or both  Medicines:   · May be given to help strengthen your bladder control  Report any side effects of medication to your healthcare provider  Do pelvic muscle exercises often:  Your pelvic muscles help you stop urinating  Squeeze these muscles tight for 5 seconds, then relax for 5 seconds  Gradually work up to squeezing for 10 seconds  Do 3 sets of 15 repetitions a day, or as directed  This will help strengthen your pelvic muscles and improve bladder control  Train your bladder:  Go to the bathroom at set times, such as every 2 hours, even if you do not feel the urge to go  You can also try to hold your urine when you feel the urge to go  For example, hold your urine for 5 minutes when you feel the urge to go  As that becomes easier, hold your urine for 10 minutes  Self-care:   · Keep a UI record  Write down how often you leak urine and how much you leak  Make a note of what you were doing when you leaked urine  · Drink liquids as directed  You may need to limit the amount of liquid you drink to help control your urine leakage  Do not drink any liquid right before you go to bed  Limit or do not have drinks that contain caffeine or alcohol  · Prevent constipation  Eat a variety of high-fiber foods  Good examples are high-fiber cereals, beans, vegetables, and whole-grain breads  Walking is the best way to trigger your intestines to have a bowel movement  · Exercise regularly and maintain a healthy weight  Weight loss and exercise will decrease pressure on your bladder and help you control your leakage  · Use a catheter as directed  to help empty your bladder  A catheter is a tiny, plastic tube that is put into your bladder to drain your urine  · Go to behavior therapy as directed  Behavior therapy may be used to help you learn to control your urge to urinate         © Copyright [employer] 2018 Information is for [employer] use only and may not be sold, redistributed or otherwise used for commercial purposes   All illustrations and images included in CareNotes® are the copyrighted property of A D A M [employer] Inc  or [facility] Nathan Lopez

## 2019-11-04 NOTE — PROGRESS NOTES
Assessment and Plan:     Problem List Items Addressed This Visit        Endocrine    Diabetes mellitus with polyneuropathy ([facility] [address] )       Lab Results   Component Value Date    HGBA1C 6 8 (H) 05/24/2019     This has been well controlled and she will continue her current medications  She is due for labwork next month and this was ordered  After a lengthy discussion about possible side effects, we will try low dose gabapentin at HS for her neuropathy symptoms and titrate as necessary  Asked daughter to call in one week with an update on her symptoms and efficacy of her gabapentin  Discussed possible side effects  Relevant Medications    gabapentin (NEURONTIN) 300 mg capsule    Type 2 diabetes mellitus with diabetic polyneuropathy, with long-term current use of insulin ([facility]) - Primary    Relevant Orders    CBC and differential    Comprehensive metabolic panel    Lipid panel    HEMOGLOBIN A1C W/ EAG ESTIMATION       Cardiovascular and Mediastinum    Essential hypertension     This is well controlled and she will continue her medications  Relevant Orders    CBC and differential    Comprehensive metabolic panel    Lipid panel    HEMOGLOBIN A1C W/ EAG ESTIMATION       Other    Gait abnormality     She is completely wheelchair bound and she and her daughter were told we will fill out any paperwork necessary for the wheelchair clinic  Other Visit Diagnoses     BMI 45 0-49 9, adult [facility])               Preventive health issues were discussed with patient, and age appropriate screening tests were ordered as noted in patient's After Visit Summary  Personalized health advice and appropriate referrals for health education or preventive services given if needed, as noted in patient's After Visit Summary       History of Present Illness:     Patient presents for Medicare Annual Wellness visit    Patient Care Team:  Chace Estevez MD as PCP - General     Problem List:     Patient Active Problem List Diagnosis    Diabetes mellitus with polyneuropathy (HCC)    Slow transit constipation    Vitamin B12 deficiency    Venous reflux    Type 2 diabetes mellitus with diabetic polyneuropathy, with long-term current use of insulin (HCC)    Essential hypertension    Right foot ulcer, limited to breakdown of skin ([address] )    Arteriosclerosis of arteries of extremities (HCC)    Gait abnormality      Past Medical and Surgical History:     Past Medical History:   Diagnosis Date    Pulmonary embolism (HCC)     Thromboembolism (HCC)     Tinnitus      Past Surgical History:   Procedure Laterality Date    ADENOIDECTOMY      APPENDECTOMY      CERVICAL FUSION      CHOLECYSTECTOMY      IVC FILTER INSERTION      W/fluorosc angiogr guidance    TONSILLECTOMY      TOTAL ABDOMINAL HYSTERECTOMY W/ BILATERAL SALPINGOOPHORECTOMY      TOTAL HIP ARTHROPLASTY        Family History:     Family History   Problem Relation Age of Onset    Diabetes Brother     Other Brother         Cardiac disorder      Social History:     Social History     Socioeconomic History    Marital status: /Civil Union     Spouse name: None    Number of children: None    Years of education: None    Highest education level: None   Occupational History    None   Social Needs    Financial resource strain: None    Food insecurity:     Worry: None     Inability: None    Transportation needs:     Medical: None     Non-medical: None   Tobacco Use    Smoking status: Never Smoker    Smokeless tobacco: Never Used   Substance and Sexual Activity    Alcohol use: Yes     Comment: Occasional    Drug use: No    Sexual activity: None   Lifestyle    Physical activity:     Days per week: None     Minutes per session: None    Stress: None   Relationships    Social connections:     Talks on phone: None     Gets together: None     Attends Catholic service: None     Active member of club or organization: None     Attends meetings of clubs or organizations: None     Relationship status: None    Intimate partner violence:     Fear of current or ex partner: None     Emotionally abused: None     Physically abused: None     Forced sexual activity: None   Other Topics Concern    None   Social History Narrative    None       Medications and Allergies:     Current Outpatient Medications   Medication Sig Dispense Refill    acetaminophen (TYLENOL) 325 mg tablet Take 2 tablets by mouth every 4 (four) hours as needed      AMILoride 5 mg tablet TAKE 1 TABLET BY MOUTH  DAILY 90 tablet 3    aspirin (ECOTRIN LOW STRENGTH) 81 mg EC tablet Take 1 tablet by mouth daily      betamethasone valerate (VALISONE) 0 1 % cream Apply topically 2 (two) times a day 15 g 2    Blood Glucose Monitoring Suppl (ADVOCATE BLOOD GLUCOSE MONITOR) ARIADNA by Does not apply route 2 (two) times a day 100 each 0    furosemide (LASIX) 40 mg tablet TAKE 1 TABLET BY MOUTH  DAILY 90 tablet 3    insulin aspart (NOVOLOG FLEXPEN) 100 Units/mL injection pen INJECT 2-10 UNITS TWICE DAILY AS NEEDED AS DIRECTED PER SLIDING SCALE 1 mL 2    Insulin Pen Needle (ADVOCATE INSULIN PEN NEEDLES) 31G X 8 MM MISC by Does not apply route 2 (two) times a day 180 each 3    polyethylene glycol (MIRALAX) 17 g packet Take 17 g by mouth daily      repaglinide (PRANDIN) 2 mg tablet TAKE 1 TABLET BY MOUTH AT  BREAKFAST, 1 TABLET AT  LUNCH, AND 2 TABLETS AT  DINNER 360 tablet 3    senna-docusate sodium (SENOKOT-S) 8 6-50 mg per tablet Take 2 tablets by mouth daily      sulfamethoxazole-trimethoprim (BACTRIM DS) 800-160 mg per tablet   3    gabapentin (NEURONTIN) 300 mg capsule Take 1 capsule (300 mg total) by mouth daily at bedtime 30 capsule 1    mupirocin (BACTROBAN) 2 % ointment Apply topically 3 (three) times a day (Patient not taking: Reported on 11/4/2019) 30 g 2     No current facility-administered medications for this visit        Allergies   Allergen Reactions    Codeine Edema    Eliquis [Apixaban] Rash    Hydrocodone Edema    Cephalexin     Cough Syrup  [Guaifenesin]     Fluticasone-Salmeterol     Lisinopril     Moxifloxacin     Mushroom Extract Complex     Penicillins       Immunizations: There is no immunization history on file for this patient  Health Maintenance: There are no preventive care reminders to display for this patient  Topic Date Due    HEPATITIS B VACCINES (1 of 3 - Risk 3-dose series) 03/25/1954    DTaP,Tdap,and Td Vaccines (1 - Tdap) 03/25/1957    Pneumococcal Vaccine: 65+ Years (1 of 2 - PCV13) 03/25/2001    INFLUENZA VACCINE  07/01/2019      Medicare Health Risk Assessment:     /60   Pulse 55   Temp 98 1 °F (36 7 °C)   Resp 18   Ht 5' 2" (1 575 m) Comment: wheelchair  Wt 117 kg (257 lb) Comment: wheelchair  SpO2 94%   BMI 47 01 kg/m²      Nigel Boyer is here for her Subsequent Wellness visit  Health Risk Assessment:   Patient rates overall health as good  Patient feels that their physical health rating is same  Eyesight was rated as same  Hearing was rated as slightly worse  Patient feels that their emotional and mental health rating is same  Pain experienced in the last 7 days has been some  Patient's pain rating has been 5/10  Patient states that she has experienced no weight loss or gain in last 6 months  Depression Screening:   PHQ-2 Score: 0      Fall Risk Screening: In the past year, patient has experienced: no history of falling in past year      Urinary Incontinence Screening:   Patient has leaked urine accidently in the last six months  Home Safety:  Patient has trouble with stairs inside or outside of their home  Patient has working smoke alarms and has working carbon monoxide detector  Home safety hazards include: none  Pt is on wheelchair, does not go up the steps    Nutrition:   Current diet is Regular and Diabetic  Medications:   Patient is currently taking over-the-counter supplements   OTC medications include: see medication list  Patient is able to manage medications  Activities of Daily Living (ADLs)/Instrumental Activities of Daily Living (IADLs):   Walk and transfer into and out of bed and chair?: No  Dress and groom yourself?: No    Bathe or shower yourself?: No    Feed yourself? Yes  Do your laundry/housekeeping?: No  Manage your money, pay your bills and track your expenses?: Yes  Make your own meals?: Yes    Do your own shopping?: Yes    Previous Hospitalizations:   Any hospitalizations or ED visits within the last 12 months?: No      Advance Care Planning:   Living will: No      Comments: Declines information  Cognitive Screening:   Provider or family/friend/caregiver concerned regarding cognition?: No    PREVENTIVE SCREENINGS      Cardiovascular Screening:    General: Screening Current      Diabetes Screening:     General: Screening Not Indicated and History Diabetes      Colorectal Cancer Screening:     General: Screening Not Indicated      Breast Cancer Screening:     General: Screening Not Indicated      Cervical Cancer Screening:    General: Screening Not Indicated      Osteoporosis Screening:    General: Patient Declines      Abdominal Aortic Aneurysm (AAA) Screening:        General: Screening Not Indicated      Lung Cancer Screening:     General: Screening Not Indicated      Hepatitis C Screening:    General: Screening Not Indicated    Other Counseling Topics:   Calcium and vitamin D intake         Julissa Underwood MD

## 2019-11-04 NOTE — ASSESSMENT & PLAN NOTE
She is completely wheelchair bound and she and her daughter were told we will fill out any paperwork necessary for the wheelchair clinic

## 2019-11-04 NOTE — ASSESSMENT & PLAN NOTE
Lab Results   Component Value Date    HGBA1C 6 8 (H) 05/24/2019     This has been well controlled and she will continue her current medications  She is due for labwork next month and this was ordered  After a lengthy discussion about possible side effects, we will try low dose gabapentin at HS for her neuropathy symptoms and titrate as necessary  Asked daughter to call in one week with an update on her symptoms and efficacy of her gabapentin  Discussed possible side effects

## 2019-11-04 NOTE — TELEPHONE ENCOUNTER
Patient was here today for her visit  She is completely wheelchair bound and needs us to call [employer] to do a home draw in early December  Patient has the lab orders  Please call [employer] home draw

## 2019-11-13 ENCOUNTER — TELEPHONE (OUTPATIENT)
Dept: FAMILY MEDICINE CLINIC | Facility: CLINIC | Age: 84
End: 2019-11-13

## 2019-11-13 NOTE — TELEPHONE ENCOUNTER
Ollie Gardnerayanna was prescribed Gabapentin but Ollie Adam stopped taking it after 2 days  It was causing blurry vision and head was foggy all morning  Daughter just wanted to let Dr Kaleigh Barraza know that she was no longer taking this medication

## 2019-11-19 ENCOUNTER — TRANSCRIBE ORDERS (OUTPATIENT)
Dept: ADMINISTRATIVE | Facility: HOSPITAL | Age: 84
End: 2019-11-19

## 2019-11-19 DIAGNOSIS — R10.9 ABDOMINAL PAIN, UNSPECIFIED ABDOMINAL LOCATION: Primary | ICD-10-CM

## 2019-11-20 ENCOUNTER — TELEPHONE (OUTPATIENT)
Dept: FAMILY MEDICINE CLINIC | Facility: CLINIC | Age: 84
End: 2019-11-20

## 2019-11-20 NOTE — TELEPHONE ENCOUNTER
I received a form from [facility] [employer], this form was already done and is scanned into the chart  They are also requesting records, gave request to Maye Childers

## 2019-11-22 ENCOUNTER — TELEPHONE (OUTPATIENT)
Dept: FAMILY MEDICINE CLINIC | Facility: CLINIC | Age: 84
End: 2019-11-22

## 2019-11-22 NOTE — TELEPHONE ENCOUNTER
DR Evaristo Li called (patients daughter) and said the wheel chair company did not receive any forms or notes from Nov 7th  Please advise

## 2019-12-05 PROBLEM — Z99.3 WHEELCHAIR BOUND: Status: ACTIVE | Noted: 2019-12-05

## 2019-12-05 LAB
ALBUMIN SERPL-MCNC: 3.1 G/DL (ref 3.6–5.1)
ALBUMIN/GLOB SERPL: 1.2 (CALC) (ref 1–2.5)
ALP SERPL-CCNC: 65 U/L (ref 33–130)
ALT SERPL-CCNC: 17 U/L (ref 6–29)
AST SERPL-CCNC: 17 U/L (ref 10–35)
BASOPHILS # BLD AUTO: 22 CELLS/UL (ref 0–200)
BASOPHILS NFR BLD AUTO: 0.4 %
BILIRUB SERPL-MCNC: 0.5 MG/DL (ref 0.2–1.2)
BUN SERPL-MCNC: 20 MG/DL (ref 7–25)
BUN/CREAT SERPL: 47 (CALC) (ref 6–22)
CALCIUM SERPL-MCNC: 8.6 MG/DL (ref 8.6–10.4)
CHLORIDE SERPL-SCNC: 108 MMOL/L (ref 98–110)
CHOLEST SERPL-MCNC: 161 MG/DL
CHOLEST/HDLC SERPL: 4.5 (CALC)
CO2 SERPL-SCNC: 25 MMOL/L (ref 20–32)
CREAT SERPL-MCNC: 0.43 MG/DL (ref 0.6–0.88)
EOSINOPHIL # BLD AUTO: 101 CELLS/UL (ref 15–500)
EOSINOPHIL NFR BLD AUTO: 1.8 %
ERYTHROCYTE [DISTWIDTH] IN BLOOD BY AUTOMATED COUNT: 12.6 % (ref 11–15)
EST. AVERAGE GLUCOSE BLD GHB EST-MCNC: 146 (CALC)
EST. AVERAGE GLUCOSE BLD GHB EST-SCNC: 8.1 (CALC)
GLOBULIN SER CALC-MCNC: 2.5 G/DL (CALC) (ref 1.9–3.7)
GLUCOSE SERPL-MCNC: 84 MG/DL (ref 65–99)
HBA1C MFR BLD: 6.7 % OF TOTAL HGB
HCT VFR BLD AUTO: 35.5 % (ref 35–45)
HDLC SERPL-MCNC: 36 MG/DL
HGB BLD-MCNC: 11.8 G/DL (ref 11.7–15.5)
LDLC SERPL CALC-MCNC: 101 MG/DL (CALC)
LYMPHOCYTES # BLD AUTO: 1786 CELLS/UL (ref 850–3900)
LYMPHOCYTES NFR BLD AUTO: 31.9 %
MCH RBC QN AUTO: 31.4 PG (ref 27–33)
MCHC RBC AUTO-ENTMCNC: 33.2 G/DL (ref 32–36)
MCV RBC AUTO: 94.4 FL (ref 80–100)
MONOCYTES # BLD AUTO: 386 CELLS/UL (ref 200–950)
MONOCYTES NFR BLD AUTO: 6.9 %
NEUTROPHILS # BLD AUTO: 3304 CELLS/UL (ref 1500–7800)
NEUTROPHILS NFR BLD AUTO: 59 %
NONHDLC SERPL-MCNC: 125 MG/DL (CALC)
PLATELET # BLD AUTO: 159 THOUSAND/UL (ref 140–400)
PMV BLD REES-ECKER: 12.3 FL (ref 7.5–12.5)
POTASSIUM SERPL-SCNC: 3.9 MMOL/L (ref 3.5–5.3)
PROT SERPL-MCNC: 5.6 G/DL (ref 6.1–8.1)
RBC # BLD AUTO: 3.76 MILLION/UL (ref 3.8–5.1)
SL AMB EGFR AFRICAN AMERICAN: 109 ML/MIN/1.73M2
SL AMB EGFR NON AFRICAN AMERICAN: 94 ML/MIN/1.73M2
SODIUM SERPL-SCNC: 143 MMOL/L (ref 135–146)
TRIGL SERPL-MCNC: 137 MG/DL
WBC # BLD AUTO: 5.6 THOUSAND/UL (ref 3.8–10.8)

## 2019-12-18 ENCOUNTER — TELEPHONE (OUTPATIENT)
Dept: FAMILY MEDICINE CLINIC | Facility: CLINIC | Age: 84
End: 2019-12-18

## 2019-12-18 NOTE — TELEPHONE ENCOUNTER
DR ROBERTS [facility]    Patient had an annual wellness visit in June  Another 1 was done in Nov   Same year  Patient has a bill for 227$     Please advise

## 2020-01-13 ENCOUNTER — HOSPITAL ENCOUNTER (OUTPATIENT)
Dept: RADIOLOGY | Facility: HOSPITAL | Age: 85
Discharge: HOME | End: 2020-01-13
Attending: INTERNAL MEDICINE
Payer: COMMERCIAL

## 2020-01-13 DIAGNOSIS — R10.9 ABDOMINAL PAIN, UNSPECIFIED ABDOMINAL LOCATION: ICD-10-CM

## 2020-01-13 PROCEDURE — 74177 CT ABD & PELVIS W/CONTRAST: CPT

## 2020-01-13 RX ADMIN — IODIXANOL 100 ML: 320 INJECTION, SOLUTION INTRAVASCULAR at 12:37

## 2020-01-17 ENCOUNTER — TRANSCRIBE ORDERS (OUTPATIENT)
Dept: ADMINISTRATIVE | Facility: HOSPITAL | Age: 85
End: 2020-01-17

## 2020-01-17 DIAGNOSIS — N28.1 RENAL CYST: Primary | ICD-10-CM

## 2020-01-17 DIAGNOSIS — R93.89 ABNORMAL X-RAY: ICD-10-CM

## 2020-01-18 DIAGNOSIS — N39.0 RECURRENT UTI: ICD-10-CM

## 2020-01-19 RX ORDER — SULFAMETHOXAZOLE AND TRIMETHOPRIM 400; 80 MG/1; MG/1
TABLET ORAL
Qty: 14 TABLET | Refills: 0 | Status: SHIPPED | OUTPATIENT
Start: 2020-01-19 | End: 2020-01-26

## 2020-01-25 ENCOUNTER — HOSPITAL ENCOUNTER (OUTPATIENT)
Dept: RADIOLOGY | Facility: HOSPITAL | Age: 85
Discharge: HOME | End: 2020-01-25
Attending: INTERNAL MEDICINE
Payer: COMMERCIAL

## 2020-01-25 DIAGNOSIS — N28.1 RENAL CYST: ICD-10-CM

## 2020-01-25 DIAGNOSIS — R93.89 ABNORMAL X-RAY: ICD-10-CM

## 2020-01-25 PROCEDURE — 76770 US EXAM ABDO BACK WALL COMP: CPT

## 2020-04-29 DIAGNOSIS — Z79.4 TYPE 2 DIABETES MELLITUS WITH DIABETIC POLYNEUROPATHY, WITH LONG-TERM CURRENT USE OF INSULIN (HCC): ICD-10-CM

## 2020-04-29 DIAGNOSIS — E11.42 TYPE 2 DIABETES MELLITUS WITH DIABETIC POLYNEUROPATHY, WITH LONG-TERM CURRENT USE OF INSULIN (HCC): ICD-10-CM

## 2020-04-29 RX ORDER — FUROSEMIDE 40 MG/1
TABLET ORAL
Qty: 90 TABLET | Refills: 3 | Status: SHIPPED | OUTPATIENT
Start: 2020-04-29 | End: 2021-01-27 | Stop reason: HOSPADM

## 2020-05-18 DIAGNOSIS — Z79.4 TYPE 2 DIABETES MELLITUS WITH DIABETIC POLYNEUROPATHY, WITH LONG-TERM CURRENT USE OF INSULIN (HCC): ICD-10-CM

## 2020-05-18 DIAGNOSIS — E11.42 TYPE 2 DIABETES MELLITUS WITH DIABETIC POLYNEUROPATHY, WITH LONG-TERM CURRENT USE OF INSULIN (HCC): ICD-10-CM

## 2020-05-19 DIAGNOSIS — Z13.83 SCREENING FOR CARDIOVASCULAR, RESPIRATORY, AND GENITOURINARY DISEASES: ICD-10-CM

## 2020-05-19 DIAGNOSIS — Z13.89 SCREENING FOR CARDIOVASCULAR, RESPIRATORY, AND GENITOURINARY DISEASES: ICD-10-CM

## 2020-05-19 DIAGNOSIS — Z79.4 TYPE 2 DIABETES MELLITUS WITH DIABETIC POLYNEUROPATHY, WITH LONG-TERM CURRENT USE OF INSULIN (HCC): Primary | ICD-10-CM

## 2020-05-19 DIAGNOSIS — E11.42 TYPE 2 DIABETES MELLITUS WITH DIABETIC POLYNEUROPATHY, WITH LONG-TERM CURRENT USE OF INSULIN (HCC): Primary | ICD-10-CM

## 2020-05-19 DIAGNOSIS — I10 ESSENTIAL HYPERTENSION: ICD-10-CM

## 2020-05-19 DIAGNOSIS — Z13.6 SCREENING FOR CARDIOVASCULAR, RESPIRATORY, AND GENITOURINARY DISEASES: ICD-10-CM

## 2020-05-19 RX ORDER — FUROSEMIDE 40 MG/1
40 TABLET ORAL DAILY
Qty: 90 TABLET | Refills: 0 | OUTPATIENT
Start: 2020-05-19

## 2020-05-22 DIAGNOSIS — E11.42 TYPE 2 DIABETES MELLITUS WITH DIABETIC POLYNEUROPATHY, WITH LONG-TERM CURRENT USE OF INSULIN (HCC): ICD-10-CM

## 2020-05-22 DIAGNOSIS — I10 ESSENTIAL HYPERTENSION: ICD-10-CM

## 2020-05-22 DIAGNOSIS — Z79.4 TYPE 2 DIABETES MELLITUS WITH DIABETIC POLYNEUROPATHY, WITH LONG-TERM CURRENT USE OF INSULIN (HCC): ICD-10-CM

## 2020-05-22 RX ORDER — REPAGLINIDE 2 MG/1
TABLET ORAL
Qty: 360 TABLET | Refills: 3 | Status: SHIPPED | OUTPATIENT
Start: 2020-05-22 | End: 2021-01-27 | Stop reason: HOSPADM

## 2020-05-22 RX ORDER — AMILORIDE HYDROCHLORIDE 5 MG/1
TABLET ORAL
Qty: 90 TABLET | Refills: 3 | Status: SHIPPED | OUTPATIENT
Start: 2020-05-22 | End: 2021-01-27 | Stop reason: HOSPADM

## 2020-05-29 LAB — HBA1C MFR BLD HPLC: 6.5 %

## 2020-06-05 ENCOUNTER — OFFICE VISIT (OUTPATIENT)
Dept: PODIATRY | Facility: CLINIC | Age: 85
End: 2020-06-05
Payer: COMMERCIAL

## 2020-06-05 VITALS
SYSTOLIC BLOOD PRESSURE: 128 MMHG | DIASTOLIC BLOOD PRESSURE: 60 MMHG | HEART RATE: 65 BPM | RESPIRATION RATE: 17 BRPM | BODY MASS INDEX: 47.29 KG/M2 | HEIGHT: 62 IN | WEIGHT: 257 LBS

## 2020-06-05 DIAGNOSIS — B35.1 ONYCHOMYCOSIS: ICD-10-CM

## 2020-06-05 DIAGNOSIS — L02.611 ABSCESS OF TOE OF RIGHT FOOT: Primary | ICD-10-CM

## 2020-06-05 DIAGNOSIS — M79.672 PAIN IN BOTH FEET: ICD-10-CM

## 2020-06-05 DIAGNOSIS — M79.671 PAIN IN BOTH FEET: ICD-10-CM

## 2020-06-05 DIAGNOSIS — E11.42 DIABETIC POLYNEUROPATHY ASSOCIATED WITH TYPE 2 DIABETES MELLITUS (HCC): ICD-10-CM

## 2020-06-05 PROCEDURE — 99213 OFFICE O/P EST LOW 20 MIN: CPT | Performed by: PODIATRIST

## 2020-06-05 RX ORDER — SULFAMETHOXAZOLE AND TRIMETHOPRIM 800; 160 MG/1; MG/1
1 TABLET ORAL EVERY 12 HOURS SCHEDULED
Qty: 20 TABLET | Refills: 0 | Status: SHIPPED | OUTPATIENT
Start: 2020-06-05 | End: 2020-06-15

## 2020-06-15 ENCOUNTER — OFFICE VISIT (OUTPATIENT)
Dept: FAMILY MEDICINE CLINIC | Facility: CLINIC | Age: 85
End: 2020-06-15
Payer: COMMERCIAL

## 2020-06-15 ENCOUNTER — TELEPHONE (OUTPATIENT)
Dept: FAMILY MEDICINE CLINIC | Facility: CLINIC | Age: 85
End: 2020-06-15

## 2020-06-15 VITALS
RESPIRATION RATE: 18 BRPM | TEMPERATURE: 98.2 F | BODY MASS INDEX: 47.01 KG/M2 | DIASTOLIC BLOOD PRESSURE: 58 MMHG | SYSTOLIC BLOOD PRESSURE: 112 MMHG | OXYGEN SATURATION: 96 % | HEIGHT: 62 IN | HEART RATE: 52 BPM

## 2020-06-15 DIAGNOSIS — E11.42 DIABETIC POLYNEUROPATHY ASSOCIATED WITH TYPE 2 DIABETES MELLITUS (HCC): ICD-10-CM

## 2020-06-15 DIAGNOSIS — Z79.4 TYPE 2 DIABETES MELLITUS WITH DIABETIC POLYNEUROPATHY, WITH LONG-TERM CURRENT USE OF INSULIN (HCC): Primary | ICD-10-CM

## 2020-06-15 DIAGNOSIS — R00.1 BRADYCARDIA: ICD-10-CM

## 2020-06-15 DIAGNOSIS — I10 ESSENTIAL HYPERTENSION: ICD-10-CM

## 2020-06-15 DIAGNOSIS — E11.42 TYPE 2 DIABETES MELLITUS WITH DIABETIC POLYNEUROPATHY, WITH LONG-TERM CURRENT USE OF INSULIN (HCC): Primary | ICD-10-CM

## 2020-06-15 PROCEDURE — 1160F RVW MEDS BY RX/DR IN RCRD: CPT | Performed by: INTERNAL MEDICINE

## 2020-06-15 PROCEDURE — 3078F DIAST BP <80 MM HG: CPT | Performed by: INTERNAL MEDICINE

## 2020-06-15 PROCEDURE — 99214 OFFICE O/P EST MOD 30 MIN: CPT | Performed by: INTERNAL MEDICINE

## 2020-06-15 PROCEDURE — 3044F HG A1C LEVEL LT 7.0%: CPT | Performed by: INTERNAL MEDICINE

## 2020-06-15 PROCEDURE — 3288F FALL RISK ASSESSMENT DOCD: CPT | Performed by: INTERNAL MEDICINE

## 2020-06-15 PROCEDURE — 1036F TOBACCO NON-USER: CPT | Performed by: INTERNAL MEDICINE

## 2020-06-15 PROCEDURE — 3074F SYST BP LT 130 MM HG: CPT | Performed by: INTERNAL MEDICINE

## 2020-06-15 PROCEDURE — 1101F PT FALLS ASSESS-DOCD LE1/YR: CPT | Performed by: INTERNAL MEDICINE

## 2020-06-15 PROCEDURE — 93000 ELECTROCARDIOGRAM COMPLETE: CPT | Performed by: INTERNAL MEDICINE

## 2020-06-15 RX ORDER — DULOXETIN HYDROCHLORIDE 20 MG/1
20 CAPSULE, DELAYED RELEASE ORAL DAILY
Qty: 30 CAPSULE | Refills: 3 | Status: SHIPPED | OUTPATIENT
Start: 2020-06-15 | End: 2020-07-09 | Stop reason: SDUPTHER

## 2020-06-16 ENCOUNTER — TELEPHONE (OUTPATIENT)
Dept: ADMINISTRATIVE | Facility: OTHER | Age: 85
End: 2020-06-16

## 2020-06-16 LAB
LEFT EYE DIABETIC RETINOPATHY: NORMAL
RIGHT EYE DIABETIC RETINOPATHY: NORMAL

## 2020-07-09 DIAGNOSIS — E11.42 DIABETIC POLYNEUROPATHY ASSOCIATED WITH TYPE 2 DIABETES MELLITUS (HCC): ICD-10-CM

## 2020-07-09 DIAGNOSIS — E11.42 TYPE 2 DIABETES MELLITUS WITH DIABETIC POLYNEUROPATHY, WITH LONG-TERM CURRENT USE OF INSULIN (HCC): ICD-10-CM

## 2020-07-09 DIAGNOSIS — Z79.4 TYPE 2 DIABETES MELLITUS WITH DIABETIC POLYNEUROPATHY, WITH LONG-TERM CURRENT USE OF INSULIN (HCC): ICD-10-CM

## 2020-07-09 RX ORDER — DULOXETIN HYDROCHLORIDE 20 MG/1
20 CAPSULE, DELAYED RELEASE ORAL DAILY
Qty: 90 CAPSULE | Refills: 1 | Status: SHIPPED | OUTPATIENT
Start: 2020-07-09 | End: 2020-07-13 | Stop reason: SDUPTHER

## 2020-07-13 RX ORDER — DULOXETIN HYDROCHLORIDE 30 MG/1
30 CAPSULE, DELAYED RELEASE ORAL DAILY
Qty: 90 CAPSULE | Refills: 1 | Status: SHIPPED | OUTPATIENT
Start: 2020-07-13 | End: 2021-01-18 | Stop reason: ALTCHOICE

## 2020-07-13 NOTE — TELEPHONE ENCOUNTER
Medication was suppose to be 30mg and we sent 20mg    Can we call pharmacy to change mg    Cymbalta medication

## 2020-07-17 ENCOUNTER — CONSULT (OUTPATIENT)
Dept: CARDIOLOGY CLINIC | Facility: CLINIC | Age: 85
End: 2020-07-17
Payer: COMMERCIAL

## 2020-07-17 VITALS
DIASTOLIC BLOOD PRESSURE: 70 MMHG | HEART RATE: 58 BPM | SYSTOLIC BLOOD PRESSURE: 118 MMHG | TEMPERATURE: 98.6 F | OXYGEN SATURATION: 97 %

## 2020-07-17 DIAGNOSIS — Z99.3 WHEELCHAIR BOUND: ICD-10-CM

## 2020-07-17 DIAGNOSIS — Z79.4 TYPE 2 DIABETES MELLITUS WITH DIABETIC POLYNEUROPATHY, WITH LONG-TERM CURRENT USE OF INSULIN (HCC): ICD-10-CM

## 2020-07-17 DIAGNOSIS — R60.0 BILATERAL LEG EDEMA: ICD-10-CM

## 2020-07-17 DIAGNOSIS — I10 ESSENTIAL HYPERTENSION: ICD-10-CM

## 2020-07-17 DIAGNOSIS — E11.42 TYPE 2 DIABETES MELLITUS WITH DIABETIC POLYNEUROPATHY, WITH LONG-TERM CURRENT USE OF INSULIN (HCC): ICD-10-CM

## 2020-07-17 DIAGNOSIS — E11.42 DIABETIC POLYNEUROPATHY ASSOCIATED WITH TYPE 2 DIABETES MELLITUS (HCC): ICD-10-CM

## 2020-07-17 DIAGNOSIS — R00.1 BRADYCARDIA: Primary | ICD-10-CM

## 2020-07-17 PROCEDURE — 3078F DIAST BP <80 MM HG: CPT | Performed by: INTERNAL MEDICINE

## 2020-07-17 PROCEDURE — 99205 OFFICE O/P NEW HI 60 MIN: CPT | Performed by: INTERNAL MEDICINE

## 2020-07-17 PROCEDURE — 3044F HG A1C LEVEL LT 7.0%: CPT | Performed by: INTERNAL MEDICINE

## 2020-07-17 PROCEDURE — 93000 ELECTROCARDIOGRAM COMPLETE: CPT | Performed by: INTERNAL MEDICINE

## 2020-07-17 PROCEDURE — 1160F RVW MEDS BY RX/DR IN RCRD: CPT | Performed by: INTERNAL MEDICINE

## 2020-07-17 PROCEDURE — 3074F SYST BP LT 130 MM HG: CPT | Performed by: INTERNAL MEDICINE

## 2020-07-17 NOTE — PROGRESS NOTES
[facility]  [address]   [place]  Cardiology Consultation    [facility] Number  [id number]  [id number]      Consult for:  Bradycardia  Appreciate consult by: Neelima Ornelas MD    1  Bradycardia  Ambulatory referral to Cardiology    POCT ECG    AMB extended holter monitor   2  Essential hypertension  POCT ECG    AMB extended holter monitor   3  Bilateral leg edema     4  BMI 45 0-49 9, adult ([facility] )     5  Wheelchair bound     6  Type 2 diabetes mellitus with diabetic polyneuropathy, with long-term current use of insulin ([facility] )     7  Diabetic polyneuropathy associated with type 2 diabetes mellitus ([facility])        Discussion/Summary:   Bradycardia/abnormal ecg- 12 lead ECG without evidence of advanced AV block or prolonged pauses  Patient denies having any syncopal episodes  Plan for 1 week extended Holter monitor to rule out for significant Tomas arrhythmias  Patient would like conservative management  Dyspnea/lower extremity edema- large component secondary to deconditioning  Patient is wheelchair bound  Daughter will place compression bandages  Given history of previous multiple pulmonary embolism cannot rule elevated pulmonary pressures  Patient would like conservative management  Continue low-salt diet  Patient currently on amiloride 5 mg plus furosemide 40 mg    Diabetes mellitus- last A1c 6 7  Her LDL is at 101    Low HDL    BMI 47    HPI:   81year-old with history of pulmonary embolism presents secondary to bradycardia  She has had multiple provider visits with low heart rates  She denies having any prior syncopal episodes  She is limited to a wheelchair  She has chronic lower extremity swelling  She has no prior history of myocardial infarction  She has no prior history of atrial fibrillation  She denies having any recent fevers or chills  She reports having a remote history of stress testing    She has been a diabetic for 20 years   She has a sedentary lifestyle      Past Medical History:   Diagnosis Date    Pulmonary embolism ([address] )     Thromboembolism (HCC)     Tinnitus      Social History     Socioeconomic History    Marital status: /Civil Union     Spouse name: Not on file    Number of children: Not on file    Years of education: Not on file    Highest education level: Not on file   Occupational History    Not on file   Social Needs    Financial resource strain: Not on file    Food insecurity:     Worry: Not on file     Inability: Not on file    Transportation needs:     Medical: Not on file     Non-medical: Not on file   Tobacco Use    Smoking status: Never Smoker    Smokeless tobacco: Never Used   Substance and Sexual Activity    Alcohol use: Yes     Comment: Occasional    Drug use: No    Sexual activity: Not on file   Lifestyle    Physical activity:     Days per week: Not on file     Minutes per session: Not on file    Stress: Not on file   Relationships    Social connections:     Talks on phone: Not on file     Gets together: Not on file     Attends Faith service: Not on file     Active member of club or organization: Not on file     Attends meetings of clubs or organizations: Not on file     Relationship status: Not on file    Intimate partner violence:     Fear of current or ex partner: Not on file     Emotionally abused: Not on file     Physically abused: Not on file     Forced sexual activity: Not on file   Other Topics Concern    Not on file   Social History Narrative    Not on file      Family History   Problem Relation Age of Onset    Diabetes Brother     Other Brother         Cardiac disorder    Heart attack Brother     Heart attack Mother     Heart attack Brother         CABG x3    Heart attack Brother     Cancer Brother      Past Surgical History:   Procedure Laterality Date    ADENOIDECTOMY      APPENDECTOMY      CERVICAL FUSION      CHOLECYSTECTOMY      IVC FILTER INSERTION W/fluorosc angiogr guidance    TONSILLECTOMY      TOTAL ABDOMINAL HYSTERECTOMY W/ BILATERAL SALPINGOOPHORECTOMY      TOTAL HIP ARTHROPLASTY         Current Outpatient Medications:     acetaminophen (TYLENOL) 325 mg tablet, Take 2 tablets by mouth every 4 (four) hours as needed, Disp: , Rfl:     AMILoride 5 mg tablet, TAKE 1 TABLET BY MOUTH  DAILY, Disp: 90 tablet, Rfl: 3    aspirin (ECOTRIN LOW STRENGTH) 81 mg EC tablet, Take 162 mg by mouth daily , Disp: , Rfl:     Blood Glucose Monitoring Suppl (ADVOCATE BLOOD GLUCOSE MONITOR) ARIADNA, by Does not apply route 2 (two) times a day, Disp: 100 each, Rfl: 0    DULoxetine (CYMBALTA) 30 mg delayed release capsule, Take 1 capsule (30 mg total) by mouth daily, Disp: 90 capsule, Rfl: 1    furosemide (LASIX) 40 mg tablet, TAKE 1 TABLET BY MOUTH  DAILY, Disp: 90 tablet, Rfl: 3    insulin aspart (NOVOLOG FLEXPEN) 100 Units/mL injection pen, INJECT 2-10 UNITS TWICE DAILY AS NEEDED AS DIRECTED PER SLIDING SCALE, Disp: 1 mL, Rfl: 2    Insulin Pen Needle (ADVOCATE INSULIN PEN NEEDLES) 31G X 8 MM MISC, by Does not apply route 2 (two) times a day, Disp: 180 each, Rfl: 3    polyethylene glycol (MIRALAX) 17 g packet, Take 17 g by mouth daily, Disp: , Rfl:     repaglinide (PRANDIN) 2 mg tablet, TAKE 1 TABLET BY MOUTH AT  BREAKFAST, 1 TABLET AT  LUNCH, AND 2 TABLETS AT  DINNER, Disp: 360 tablet, Rfl: 3    senna-docusate sodium (SENOKOT-S) 8 6-50 mg per tablet, Take 2 tablets by mouth daily as needed , Disp: , Rfl:     betamethasone valerate (VALISONE) 0 1 % cream, Apply topically 2 (two) times a day (Patient not taking: Reported on 7/17/2020), Disp: 15 g, Rfl: 2    sulfamethoxazole-trimethoprim (BACTRIM DS) 800-160 mg per tablet, , Disp: , Rfl: 3  Allergies   Allergen Reactions    Codeine Edema    Eliquis [Apixaban] Rash    Hydrocodone Edema    Cephalexin     Cough Syrup  [Guaifenesin]     Fluticasone-Salmeterol     Lisinopril     Moxifloxacin     Mushroom Extract Complex     Penicillins      Vitals:    07/17/20 1534   BP: 118/70   BP Location: Left arm   Patient Position: Sitting   Cuff Size: Large   Pulse: 58   Temp: 98 6 °F (37 °C)   TempSrc: Temporal   SpO2: 97%       Review of Systems:   Review of Systems   Constitutional: Negative  Negative for activity change, appetite change, chills, diaphoresis, fatigue, fever and unexpected weight change  HENT: Negative  Negative for congestion, dental problem, drooling, ear discharge, ear pain, facial swelling, hearing loss, mouth sores, nosebleeds, postnasal drip, rhinorrhea, sinus pressure, sinus pain, sneezing, sore throat, tinnitus, trouble swallowing and voice change  Eyes: Negative  Negative for photophobia, pain, redness, itching and visual disturbance  Respiratory: Positive for shortness of breath  Negative for apnea, cough, choking, chest tightness, wheezing and stridor  Cardiovascular: Positive for leg swelling  Negative for chest pain and palpitations  Gastrointestinal: Negative  Negative for abdominal distention, abdominal pain, anal bleeding, blood in stool, constipation, diarrhea, nausea, rectal pain and vomiting  Endocrine: Negative  Negative for cold intolerance, heat intolerance, polydipsia, polyphagia and polyuria  Genitourinary: Negative  Negative for decreased urine volume, difficulty urinating, dyspareunia, dysuria, enuresis, flank pain, frequency, genital sores, hematuria, menstrual problem, pelvic pain, urgency, vaginal bleeding, vaginal discharge and vaginal pain  Musculoskeletal: Negative  Negative for arthralgias, back pain, gait problem, joint swelling, myalgias, neck pain and neck stiffness  Skin: Negative  Negative for color change, pallor, rash and wound  Allergic/Immunologic: Negative  Negative for environmental allergies, food allergies and immunocompromised state  Neurological: Negative    Negative for dizziness, tremors, seizures, syncope, facial asymmetry, speech difficulty, weakness, light-headedness, numbness and headaches  Hematological: Negative  Negative for adenopathy  Does not bruise/bleed easily  Psychiatric/Behavioral: Negative  Negative for agitation, behavioral problems, confusion, decreased concentration, dysphoric mood, hallucinations, self-injury, sleep disturbance and suicidal ideas  The patient is not nervous/anxious and is not hyperactive  All other systems reviewed and are negative  Vitals:    07/17/20 1534   BP: 118/70   BP Location: Left arm   Patient Position: Sitting   Cuff Size: Large   Pulse: 58   Temp: 98 6 °F (37 °C)   TempSrc: Temporal   SpO2: 97%     Physical Examination:   Physical Exam   Constitutional: She is oriented to person, place, and time  No distress  HENT:   Head: Normocephalic and atraumatic  Right Ear: External ear normal    Left Ear: External ear normal    Eyes: Pupils are equal, round, and reactive to light  Conjunctivae are normal  Right eye exhibits no discharge  Left eye exhibits no discharge  No scleral icterus  Neck: Normal range of motion  Neck supple  No JVD present  No tracheal deviation present  No thyromegaly present  Cardiovascular: Normal rate and regular rhythm  Exam reveals gallop  Exam reveals no friction rub  Murmur heard  Pulmonary/Chest: Effort normal and breath sounds normal  No stridor  No respiratory distress  She has no wheezes  She has no rales  She exhibits no tenderness  Abdominal: Soft  Bowel sounds are normal  She exhibits distension  She exhibits no mass  There is no tenderness  There is no rebound and no guarding  Musculoskeletal: Normal range of motion  She exhibits edema  She exhibits no tenderness or deformity  Neurological: She is alert and oriented to person, place, and time  She has normal reflexes  She displays normal reflexes  No cranial nerve deficit  She exhibits normal muscle tone  Coordination normal    Skin: Skin is warm and dry  No rash noted   She is not diaphoretic  No erythema  No pallor  Psychiatric: She has a normal mood and affect  Her behavior is normal  Judgment and thought content normal        Labs:     Lab Results   Component Value Date    WBC 5 6 12/04/2019    HGB 11 8 12/04/2019    HCT 35 5 12/04/2019    MCV 94 4 12/04/2019    RDW 12 6 12/04/2019     12/04/2019     BMP:  Lab Results   Component Value Date    SODIUM 143 12/04/2019    K 3 9 12/04/2019     12/04/2019    CO2 25 12/04/2019    BUN 20 12/04/2019    CREATININE 0 43 (L) 12/04/2019    GLUC 84 12/04/2019    CALCIUM 8 6 12/04/2019     LFT:  Lab Results   Component Value Date    AST 17 12/04/2019    ALT 17 12/04/2019    ALKPHOS 65 12/04/2019    TP 5 6 (L) 12/04/2019    ALB 3 1 (L) 12/04/2019      No results found for: [facility]  Lab Results   Component Value Date    HGBA1C 6 5 05/29/2020     Lipid Profile:   Lab Results   Component Value Date    CHOLESTEROL 161 12/04/2019    HDL 36 (L) 12/04/2019    LDLCALC 101 (H) 12/04/2019    TRIG 137 12/04/2019     Lab Results   Component Value Date    CHOLESTEROL 161 12/04/2019    CHOLESTEROL 192 05/24/2019     Imaging & Testing   I have personally reviewed pertinent reports  Cardiac Testing     EKG: Personally reviewed  Sinus bradycardia cannot rule out inferior mi intraventricular conduction delay      Viky Gray MD [facility]  [phone]  Please call with any questions or suggestions    Counseling :  A description of the counseling:   Goals and Barriers:  Patient's ability to self care:  Medication side effect reviewed with patient in detail and all their questions answered  "Portions of the record may have been created with voice recognition software  Occasional wrong word or "sound a like" substitutions may have occurred due to the inherent limitations of voice recognition software  Read the chart carefully and recognize, using context, where substitutions have occurred   Please call if you have any questions  "

## 2020-07-22 ENCOUNTER — TELEPHONE (OUTPATIENT)
Dept: FAMILY MEDICINE CLINIC | Facility: CLINIC | Age: 85
End: 2020-07-22

## 2020-07-22 NOTE — TELEPHONE ENCOUNTER
Spoke to patients daughter and I asked her about this company and she stated that they use [employer]  And that usually the company reaches out to her directly and gives her all of the supplies that she needs and that she was good for now  I told the daughter that I would call the company to see what they want  I called the company and the call was very static and they were requiring information on the patient because they were running out of supplies  I stated that I had just spoken to the daughter and she said that she did not need anymore supplies  They stated that the patient has been a client since 2017, then they asked for the patients doctors name  (I would think that they would have Dr Karyn Corbett on file since this is their patient ) I did not feel comfortable giving away information so I called the daughter back and gave her the number along with the reference number  She stated that she will reach out to them and call us back to inform us if it fine to speak with them    ( daughter had a completely different number for the company that she uses)

## 2020-07-22 NOTE — TELEPHONE ENCOUNTER
2018 [employer] calling stating they faxed twice paperwork for diabetic supplies  I told representative that I would take a message        Please call to make sure this is not a [facility]  [phone]  Ref# [id number]

## 2020-07-22 NOTE — TELEPHONE ENCOUNTER
Patient daughter called back and she stated that it is fine to speak with them and they will reach out to tomorrow to us

## 2020-07-23 NOTE — TELEPHONE ENCOUNTER
Spoke to the lady from this company and she stated that she needed recent medical records  I stated that she will have to fax over a medical record request in order to proceed with request  Juan English placed me on hold and the call dropped  I called back and a gentleman picked up and he is faxing over the documents       Nayeli Dimas MA

## 2020-07-23 NOTE — TELEPHONE ENCOUNTER
[employer] faxed over release form this morning  Can we check solarity    Thank you

## 2020-07-23 NOTE — TELEPHONE ENCOUNTER
Tuesday at 3:00 pm Someone called from [facility], I was unable to make out her name, left a message on result hotline  But she was calling regarding pts diabetic testing supplies  They need recent chart notes faxed over for insurance coverage as the notes they have are too old  They need this faxed to [phone]  Any questions the number to call is [phone]  [place] [id number]   [place], [place]

## 2020-08-24 ENCOUNTER — CLINICAL SUPPORT (OUTPATIENT)
Dept: CARDIOLOGY CLINIC | Facility: CLINIC | Age: 85
End: 2020-08-24
Payer: COMMERCIAL

## 2020-08-24 DIAGNOSIS — R00.1 BRADYCARDIA: ICD-10-CM

## 2020-08-24 DIAGNOSIS — I10 ESSENTIAL HYPERTENSION: ICD-10-CM

## 2020-08-24 PROCEDURE — 0298T PR EXT ECG > 48HR TO 21 DAY REVIEW AND INTERPRETATN: CPT | Performed by: INTERNAL MEDICINE

## 2020-08-28 ENCOUNTER — TELEPHONE (OUTPATIENT)
Dept: CARDIOLOGY CLINIC | Facility: CLINIC | Age: 85
End: 2020-08-28

## 2020-08-28 NOTE — TELEPHONE ENCOUNTER
----- Message from Michael Lucio MD sent at 8/27/2020  8:57 AM EDT -----  Patient had a min HR of 45 bpm, max HR of 133 bpm, and avg HR of 60  bpm  Predominant underlying rhythm was Sinus Rhythm  Bundle Branch  Block/IVCD was present  2 Supraventricular Tachycardia runs occurred, the  run with the fastest interval lasting 7 beats with a max rate of 133 bpm,  the longest lasting 8 beats with an avg rate of 105 bpm  Isolated SVEs  were rare (<1 0%), SVE Couplets were rare (<1 0%), and no SVE Triplets  were present  Isolated VEs were rare (<1 0%), and no VE Couplets or VE  Triplets were present  Interpretation: Overall average heart rate 60  No major pauses  No indication for pacemaker

## 2020-10-19 ENCOUNTER — OFFICE VISIT (OUTPATIENT)
Dept: CARDIOLOGY CLINIC | Facility: CLINIC | Age: 85
End: 2020-10-19
Payer: COMMERCIAL

## 2020-10-19 VITALS
DIASTOLIC BLOOD PRESSURE: 70 MMHG | HEART RATE: 54 BPM | TEMPERATURE: 98.2 F | SYSTOLIC BLOOD PRESSURE: 134 MMHG | BODY MASS INDEX: 47.01 KG/M2 | OXYGEN SATURATION: 95 % | HEIGHT: 62 IN

## 2020-10-19 DIAGNOSIS — R00.1 BRADYCARDIA: Primary | ICD-10-CM

## 2020-10-19 DIAGNOSIS — R06.00 EXERTIONAL DYSPNEA: ICD-10-CM

## 2020-10-19 DIAGNOSIS — R60.0 BILATERAL LEG EDEMA: ICD-10-CM

## 2020-10-19 PROCEDURE — 3078F DIAST BP <80 MM HG: CPT | Performed by: INTERNAL MEDICINE

## 2020-10-19 PROCEDURE — 1036F TOBACCO NON-USER: CPT | Performed by: INTERNAL MEDICINE

## 2020-10-19 PROCEDURE — 99214 OFFICE O/P EST MOD 30 MIN: CPT | Performed by: INTERNAL MEDICINE

## 2020-10-19 PROCEDURE — 3075F SYST BP GE 130 - 139MM HG: CPT | Performed by: INTERNAL MEDICINE

## 2020-10-19 PROCEDURE — 1160F RVW MEDS BY RX/DR IN RCRD: CPT | Performed by: INTERNAL MEDICINE

## 2020-12-21 ENCOUNTER — TELEPHONE (OUTPATIENT)
Dept: FAMILY MEDICINE CLINIC | Facility: CLINIC | Age: 85
End: 2020-12-21

## 2020-12-21 DIAGNOSIS — I10 ESSENTIAL HYPERTENSION: ICD-10-CM

## 2020-12-21 DIAGNOSIS — E11.42 TYPE 2 DIABETES MELLITUS WITH DIABETIC POLYNEUROPATHY, WITH LONG-TERM CURRENT USE OF INSULIN (HCC): Primary | ICD-10-CM

## 2020-12-21 DIAGNOSIS — E53.8 VITAMIN B12 DEFICIENCY: ICD-10-CM

## 2020-12-21 DIAGNOSIS — Z79.4 TYPE 2 DIABETES MELLITUS WITH DIABETIC POLYNEUROPATHY, WITH LONG-TERM CURRENT USE OF INSULIN (HCC): Primary | ICD-10-CM

## 2021-01-09 ENCOUNTER — LAB (OUTPATIENT)
Dept: LAB | Facility: HOSPITAL | Age: 86
End: 2021-01-09
Attending: INTERNAL MEDICINE
Payer: COMMERCIAL

## 2021-01-09 ENCOUNTER — TRANSCRIBE ORDERS (OUTPATIENT)
Dept: ADMINISTRATIVE | Facility: HOSPITAL | Age: 86
End: 2021-01-09

## 2021-01-09 DIAGNOSIS — E13.42 DIABETIC POLYNEUROPATHY ASSOCIATED WITH OTHER SPECIFIED DIABETES MELLITUS (HCC): ICD-10-CM

## 2021-01-09 DIAGNOSIS — I10 ESSENTIAL HYPERTENSION: ICD-10-CM

## 2021-01-09 DIAGNOSIS — E53.8 VITAMIN B12 DEFICIENCY: ICD-10-CM

## 2021-01-09 DIAGNOSIS — Z79.4 TYPE 2 DIABETES MELLITUS WITH DIABETIC POLYNEUROPATHY, WITH LONG-TERM CURRENT USE OF INSULIN (HCC): ICD-10-CM

## 2021-01-09 DIAGNOSIS — E13.42 DIABETIC POLYNEUROPATHY ASSOCIATED WITH OTHER SPECIFIED DIABETES MELLITUS (HCC): Primary | ICD-10-CM

## 2021-01-09 DIAGNOSIS — E11.42 TYPE 2 DIABETES MELLITUS WITH DIABETIC POLYNEUROPATHY, WITH LONG-TERM CURRENT USE OF INSULIN (HCC): ICD-10-CM

## 2021-01-09 LAB
ALBUMIN SERPL BCP-MCNC: 3 G/DL (ref 3.5–5)
ALP SERPL-CCNC: 98 U/L (ref 46–116)
ALT SERPL W P-5'-P-CCNC: 34 U/L (ref 12–78)
ANION GAP SERPL CALCULATED.3IONS-SCNC: 5 MMOL/L (ref 4–13)
AST SERPL W P-5'-P-CCNC: 30 U/L (ref 5–45)
BASOPHILS # BLD AUTO: 0.04 THOUSANDS/ΜL (ref 0–0.1)
BASOPHILS NFR BLD AUTO: 1 % (ref 0–1)
BILIRUB SERPL-MCNC: 0.5 MG/DL (ref 0.2–1)
BUN SERPL-MCNC: 22 MG/DL (ref 5–25)
CALCIUM ALBUM COR SERPL-MCNC: 9.6 MG/DL (ref 8.3–10.1)
CALCIUM SERPL-MCNC: 8.8 MG/DL (ref 8.3–10.1)
CHLORIDE SERPL-SCNC: 107 MMOL/L (ref 100–108)
CHOLEST SERPL-MCNC: 180 MG/DL (ref 50–200)
CO2 SERPL-SCNC: 30 MMOL/L (ref 21–32)
CREAT SERPL-MCNC: 0.74 MG/DL (ref 0.6–1.3)
EOSINOPHIL # BLD AUTO: 0.12 THOUSAND/ΜL (ref 0–0.61)
EOSINOPHIL NFR BLD AUTO: 2 % (ref 0–6)
ERYTHROCYTE [DISTWIDTH] IN BLOOD BY AUTOMATED COUNT: 12.6 % (ref 11.6–15.1)
EST. AVERAGE GLUCOSE BLD GHB EST-MCNC: 140 MG/DL
GFR SERPL CREATININE-BSD FRML MDRD: 75 ML/MIN/1.73SQ M
GLUCOSE P FAST SERPL-MCNC: 98 MG/DL (ref 65–99)
HBA1C MFR BLD: 6.5 %
HCT VFR BLD AUTO: 42.2 % (ref 34.8–46.1)
HDLC SERPL-MCNC: 45 MG/DL
HGB BLD-MCNC: 12.9 G/DL (ref 11.5–15.4)
IMM GRANULOCYTES # BLD AUTO: 0.02 THOUSAND/UL (ref 0–0.2)
IMM GRANULOCYTES NFR BLD AUTO: 0 % (ref 0–2)
LDLC SERPL CALC-MCNC: 113 MG/DL (ref 0–100)
LYMPHOCYTES # BLD AUTO: 2.08 THOUSANDS/ΜL (ref 0.6–4.47)
LYMPHOCYTES NFR BLD AUTO: 34 % (ref 14–44)
MCH RBC QN AUTO: 30.6 PG (ref 26.8–34.3)
MCHC RBC AUTO-ENTMCNC: 30.6 G/DL (ref 31.4–37.4)
MCV RBC AUTO: 100 FL (ref 82–98)
MONOCYTES # BLD AUTO: 0.4 THOUSAND/ΜL (ref 0.17–1.22)
MONOCYTES NFR BLD AUTO: 7 % (ref 4–12)
NEUTROPHILS # BLD AUTO: 3.47 THOUSANDS/ΜL (ref 1.85–7.62)
NEUTS SEG NFR BLD AUTO: 56 % (ref 43–75)
NONHDLC SERPL-MCNC: 135 MG/DL
NRBC BLD AUTO-RTO: 0 /100 WBCS
PLATELET # BLD AUTO: 173 THOUSANDS/UL (ref 149–390)
PMV BLD AUTO: 11.5 FL (ref 8.9–12.7)
POTASSIUM SERPL-SCNC: 4 MMOL/L (ref 3.5–5.3)
PROT SERPL-MCNC: 7.3 G/DL (ref 6.4–8.2)
RBC # BLD AUTO: 4.21 MILLION/UL (ref 3.81–5.12)
SODIUM SERPL-SCNC: 142 MMOL/L (ref 136–145)
TRIGL SERPL-MCNC: 111 MG/DL
VIT B12 SERPL-MCNC: 234 PG/ML (ref 100–900)
WBC # BLD AUTO: 6.13 THOUSAND/UL (ref 4.31–10.16)

## 2021-01-09 PROCEDURE — 36415 COLL VENOUS BLD VENIPUNCTURE: CPT

## 2021-01-09 PROCEDURE — 82607 VITAMIN B-12: CPT

## 2021-01-09 PROCEDURE — 80061 LIPID PANEL: CPT

## 2021-01-09 PROCEDURE — 85025 COMPLETE CBC W/AUTO DIFF WBC: CPT

## 2021-01-09 PROCEDURE — 80053 COMPREHEN METABOLIC PANEL: CPT

## 2021-01-09 PROCEDURE — 83036 HEMOGLOBIN GLYCOSYLATED A1C: CPT

## 2021-01-14 ENCOUNTER — TELEPHONE (OUTPATIENT)
Dept: FAMILY MEDICINE CLINIC | Facility: CLINIC | Age: 86
End: 2021-01-14

## 2021-01-14 NOTE — PROGRESS NOTES
Assessment/Plan:    There are no diagnoses linked to this encounter  BMI Counseling: There is no height or weight on file to calculate BMI  The BMI is above normal  Nutrition recommendations include reducing portion sizes and decreasing overall calorie intake  There are no Patient Instructions on file for this visit  No follow-ups on file  Subjective:      Patient ID: Basilio Valdez is a 81 y o  female  No chief complaint on file  Here for a follow up visit  Her  passed away recently and her daughter moved in with her  Daughter is taking a leave of absence to care for her and has paperwork that need to be completed  Denies hypoglycemia  Up to date with eye exam   Neuropathy is unchanged but she did not tolerate cymbalta, gabapentin, or lyrica  She has seen the cardiologist, had normal holter per patient, and has echo scheduled  There is no dizziness or lightheadedness  The following portions of the patient's history were reviewed and updated as appropriate: allergies, current medications, past family history, past medical history, past social history, past surgical history and problem list     Review of Systems   Constitutional: Negative  Respiratory: Negative  Cardiovascular: Negative  Endocrine: Negative            Current Outpatient Medications   Medication Sig Dispense Refill    acetaminophen (TYLENOL) 325 mg tablet Take 2 tablets by mouth every 4 (four) hours as needed      AMILoride 5 mg tablet TAKE 1 TABLET BY MOUTH  DAILY 90 tablet 3    aspirin (ECOTRIN LOW STRENGTH) 81 mg EC tablet Take 162 mg by mouth daily       betamethasone valerate (VALISONE) 0 1 % cream Apply topically 2 (two) times a day (Patient not taking: Reported on 7/17/2020) 15 g 2    bisacodyl (DULCOLAX) 5 mg EC tablet Take 5 mg by mouth daily as needed for constipation      Blood Glucose Monitoring Suppl (ADVOCATE BLOOD GLUCOSE MONITOR) ARIADNA by Does not apply route 2 (two) times a day 100 each 0    DULoxetine (CYMBALTA) 30 mg delayed release capsule Take 1 capsule (30 mg total) by mouth daily (Patient not taking: Reported on 10/19/2020) 90 capsule 1    furosemide (LASIX) 40 mg tablet TAKE 1 TABLET BY MOUTH  DAILY 90 tablet 3    insulin aspart (NOVOLOG FLEXPEN) 100 Units/mL injection pen INJECT 2-10 UNITS TWICE DAILY AS NEEDED AS DIRECTED PER SLIDING SCALE 1 mL 2    Insulin Pen Needle (ADVOCATE INSULIN PEN NEEDLES) 31G X 8 MM MISC by Does not apply route 2 (two) times a day 180 each 3    polyethylene glycol (MIRALAX) 17 g packet Take 17 g by mouth daily      repaglinide (PRANDIN) 2 mg tablet TAKE 1 TABLET BY MOUTH AT  BREAKFAST, 1 TABLET AT  LUNCH, AND 2 TABLETS AT  DINNER 360 tablet 3    senna-docusate sodium (SENOKOT-S) 8 6-50 mg per tablet Take 2 tablets by mouth daily as needed       sulfamethoxazole-trimethoprim (BACTRIM DS) 800-160 mg per tablet   3     No current facility-administered medications for this visit  Objective: There were no vitals taken for this visit  Physical Exam  Constitutional:       Appearance: She is well-developed  She is obese  Eyes:      Conjunctiva/sclera: Conjunctivae normal    Neck:      Musculoskeletal: Neck supple  Thyroid: No thyromegaly  Vascular: No JVD  Cardiovascular:      Rate and Rhythm: Normal rate and regular rhythm  Pulses: no weak pulses          Dorsalis pedis pulses are 2+ on the right side and 2+ on the left side  Heart sounds: Normal heart sounds  No murmur  No friction rub  No gallop  Pulmonary:      Effort: Pulmonary effort is normal       Breath sounds: Normal breath sounds  No wheezing or rales  Abdominal:      General: Bowel sounds are normal  There is no distension  Palpations: Abdomen is soft  Tenderness: There is no abdominal tenderness  Feet:      Right foot:      Skin integrity: No ulcer, skin breakdown, erythema, warmth, callus or dry skin        Left foot:      Skin integrity: No ulcer, skin breakdown, erythema, warmth, callus or dry skin  Neurological:      Mental Status: She is alert  Patient's shoes and socks removed  Right Foot/Ankle   Right Foot Inspection  Skin Exam: skin normal and skin intact no dry skin, no warmth, no callus, no erythema, no maceration, no abnormal color, no pre-ulcer, no ulcer and no callus                          Toe Exam: ROM and strength within normal limits  Sensory       Monofilament testing: absent  Vascular  Capillary refills: < 3 seconds  The right DP pulse is 2+  Left Foot/Ankle  Left Foot Inspection  Skin Exam: skin normal and skin intactno dry skin, no warmth, no erythema, no maceration, normal color, no pre-ulcer, no ulcer and no callus                         Toe Exam: ROM and strength within normal limits                   Sensory       Monofilament: absent  Vascular  Capillary refills: < 3 seconds  The left DP pulse is 2+  Assign Risk Category:  No deformity present; Loss of protective sensation;  No weak pulses       Risk: 2           Elizabeth Ramirez MD

## 2021-01-18 ENCOUNTER — OFFICE VISIT (OUTPATIENT)
Dept: FAMILY MEDICINE CLINIC | Facility: CLINIC | Age: 86
End: 2021-01-18
Payer: COMMERCIAL

## 2021-01-18 VITALS — DIASTOLIC BLOOD PRESSURE: 50 MMHG | RESPIRATION RATE: 16 BRPM | HEART RATE: 68 BPM | SYSTOLIC BLOOD PRESSURE: 94 MMHG

## 2021-01-18 DIAGNOSIS — E11.42 TYPE 2 DIABETES MELLITUS WITH DIABETIC POLYNEUROPATHY, WITH LONG-TERM CURRENT USE OF INSULIN (HCC): Primary | ICD-10-CM

## 2021-01-18 DIAGNOSIS — I10 ESSENTIAL HYPERTENSION: ICD-10-CM

## 2021-01-18 DIAGNOSIS — K59.01 SLOW TRANSIT CONSTIPATION: ICD-10-CM

## 2021-01-18 DIAGNOSIS — E11.42 DIABETIC POLYNEUROPATHY ASSOCIATED WITH TYPE 2 DIABETES MELLITUS (HCC): ICD-10-CM

## 2021-01-18 DIAGNOSIS — Z79.4 TYPE 2 DIABETES MELLITUS WITH DIABETIC POLYNEUROPATHY, WITH LONG-TERM CURRENT USE OF INSULIN (HCC): Primary | ICD-10-CM

## 2021-01-18 PROCEDURE — 3078F DIAST BP <80 MM HG: CPT | Performed by: INTERNAL MEDICINE

## 2021-01-18 PROCEDURE — G0439 PPPS, SUBSEQ VISIT: HCPCS | Performed by: INTERNAL MEDICINE

## 2021-01-18 PROCEDURE — 3725F SCREEN DEPRESSION PERFORMED: CPT | Performed by: INTERNAL MEDICINE

## 2021-01-18 PROCEDURE — 1036F TOBACCO NON-USER: CPT | Performed by: INTERNAL MEDICINE

## 2021-01-18 PROCEDURE — 1101F PT FALLS ASSESS-DOCD LE1/YR: CPT | Performed by: INTERNAL MEDICINE

## 2021-01-18 PROCEDURE — 3288F FALL RISK ASSESSMENT DOCD: CPT | Performed by: INTERNAL MEDICINE

## 2021-01-18 PROCEDURE — 99214 OFFICE O/P EST MOD 30 MIN: CPT | Performed by: INTERNAL MEDICINE

## 2021-01-18 PROCEDURE — 1170F FXNL STATUS ASSESSED: CPT | Performed by: INTERNAL MEDICINE

## 2021-01-18 PROCEDURE — 1125F AMNT PAIN NOTED PAIN PRSNT: CPT | Performed by: INTERNAL MEDICINE

## 2021-01-18 PROCEDURE — 1160F RVW MEDS BY RX/DR IN RCRD: CPT | Performed by: INTERNAL MEDICINE

## 2021-01-18 PROCEDURE — 3074F SYST BP LT 130 MM HG: CPT | Performed by: INTERNAL MEDICINE

## 2021-01-18 NOTE — PATIENT INSTRUCTIONS
Medicare Preventive Visit Patient Instructions  Thank you for completing your Welcome to Medicare Visit or Medicare Annual Wellness Visit today  Your next wellness visit will be due in one year (1/18/2022)  The screening/preventive services that you may require over the next 5-10 years are detailed below  Some tests may not apply to you based off risk factors and/or age  Screening tests ordered at today's visit but not completed yet may show as past due  Also, please note that scanned in results may not display below  Preventive Screenings:  Service Recommendations Previous Testing/Comments   Colorectal Cancer Screening  * Colonoscopy    * Fecal Occult Blood Test (FOBT)/Fecal Immunochemical Test (FIT)  * Fecal DNA/Cologuard Test  * Flexible Sigmoidoscopy Age: 54-65 years old   Colonoscopy: every 10 years (may be performed more frequently if at higher risk)  OR  FOBT/FIT: every 1 year  OR  Cologuard: every 3 years  OR  Sigmoidoscopy: every 5 years  Screening may be recommended earlier than age 48 if at higher risk for colorectal cancer  Also, an individualized decision between you and your healthcare provider will decide whether screening between the ages of 74-80 would be appropriate  Colonoscopy: Not on file  FOBT/FIT: Not on file  Cologuard: Not on file  Sigmoidoscopy: Not on file         Breast Cancer Screening Age: 36 years old  Frequency: every 1-2 years  Not required if history of left and right mastectomy Mammogram: Not on file       Cervical Cancer Screening Between the ages of 21-29, pap smear recommended once every 3 years  Between the ages of 33-67, can perform pap smear with HPV co-testing every 5 years     Recommendations may differ for women with a history of total hysterectomy, cervical cancer, or abnormal pap smears in past  Pap Smear: Not on file    Screening Not Indicated   Hepatitis C Screening Once for adults born between [facility]  More frequently in patients at high risk for Hepatitis C Hep C Antibody: Not on file       Diabetes Screening 1-2 times per year if you're at risk for diabetes or have pre-diabetes Fasting glucose: 98 mg/dL   A1C: 6 5 %    Screening Not Indicated  History Diabetes   Cholesterol Screening Once every 5 years if you don't have a lipid disorder  May order more often based on risk factors  Lipid panel: 01/09/2021    Screening Current     Other Preventive Screenings Covered by Medicare:  1  Abdominal Aortic Aneurysm (AAA) Screening: covered once if your at risk  You're considered to be at risk if you have a family history of AAA  2  Lung Cancer Screening: covers low dose CT scan once per year if you meet all of the following conditions: (1) Age 50-69; (2) No signs or symptoms of lung cancer; (3) Current smoker or have quit smoking within the last 15 years; (4) You have a tobacco smoking history of at least 30 pack years (packs per day multiplied by number of years you smoked); (5) You get a written order from a healthcare provider  3  Glaucoma Screening: covered annually if you're considered high risk: (1) You have diabetes OR (2) Family history of glaucoma OR (3)  aged 48 and older OR (3)  American aged 72 and older  3  Osteoporosis Screening: covered every 2 years if you meet one of the following conditions: (1) You're estrogen deficient and at risk for osteoporosis based off medical history and other findings; (2) Have a vertebral abnormality; (3) On glucocorticoid therapy for more than 3 months; (4) Have primary hyperparathyroidism; (5) On osteoporosis medications and need to assess response to drug therapy  · Last bone density test (DXA Scan): Not on file  5  HIV Screening: covered annually if you're between the age of 12-76  Also covered annually if you are younger than 13 and older than 72 with risk factors for HIV infection  For pregnant patients, it is covered up to 3 times per pregnancy      Immunizations:  Immunization Recommendations Influenza Vaccine Annual influenza vaccination during flu season is recommended for all persons aged >= 6 months who do not have contraindications   Pneumococcal Vaccine (Prevnar and Pneumovax)  * Prevnar = PCV13  * Pneumovax = PPSV23   Adults 25-60 years old: 1-3 doses may be recommended based on certain risk factors  Adults 72 years old: Prevnar (PCV13) vaccine recommended followed by Pneumovax (PPSV23) vaccine  If already received PPSV23 since turning 65, then PCV13 recommended at least one year after PPSV23 dose  Hepatitis B Vaccine 3 dose series if at intermediate or high risk (ex: diabetes, end stage renal disease, liver disease)   Tetanus (Td) Vaccine - COST NOT COVERED BY MEDICARE PART B Following completion of primary series, a booster dose should be given every 10 years to maintain immunity against tetanus  Td may also be given as tetanus wound prophylaxis  Tdap Vaccine - COST NOT COVERED BY MEDICARE PART B Recommended at least once for all adults  For pregnant patients, recommended with each pregnancy  Shingles Vaccine (Shingrix) - COST NOT COVERED BY MEDICARE PART B  2 shot series recommended in those aged 48 and above     Health Maintenance Due:  There are no preventive care reminders to display for this patient  Immunizations Due:      Topic Date Due    DTaP,Tdap,and Td Vaccines (1 - Tdap) 03/25/1956    Pneumococcal Vaccine: 65+ Years (1 of 1 - PPSV23) 03/25/2001    Influenza Vaccine (1) 09/01/2020     Advance Directives   What are advance directives? Advance directives are legal documents that state your wishes and plans for medical care  These plans are made ahead of time in case you lose your ability to make decisions for yourself  Advance directives can apply to any medical decision, such as the treatments you want, and if you want to donate organs  What are the types of advance directives? There are many types of advance directives, and each state has rules about how to use them  You may choose a combination of any of the following:  · Living will: This is a written record of the treatment you want  You can also choose which treatments you do not want, which to limit, and which to stop at a certain time  This includes surgery, medicine, IV fluid, and tube feedings  · Durable power of  for healthcare [facility]): This is a written record that states who you want to make healthcare choices for you when you are unable to make them for yourself  This person, called a proxy, is usually a family member or a friend  You may choose more than 1 proxy  · Do not resuscitate (DNR) order:  A DNR order is used in case your heart stops beating or you stop breathing  It is a request not to have certain forms of treatment, such as CPR  A DNR order may be included in other types of advance directives  · Medical directive: This covers the care that you want if you are in a coma, near death, or unable to make decisions for yourself  You can list the treatments you want for each condition  Treatment may include pain medicine, surgery, blood transfusions, dialysis, IV or tube feedings, and a ventilator (breathing machine)  · Values history: This document has questions about your views, beliefs, and how you feel and think about life  This information can help others choose the care that you would choose  Why are advance directives important? An advance directive helps you control your care  Although spoken wishes may be used, it is better to have your wishes written down  Spoken wishes can be misunderstood, or not followed  Treatments may be given even if you do not want them  An advance directive may make it easier for your family to make difficult choices about your care  Urinary Incontinence   Urinary incontinence (UI)  is when you lose control of your bladder  UI develops because your bladder cannot store or empty urine properly   The 3 most common types of UI are stress incontinence, urge incontinence, or both  Medicines:   · May be given to help strengthen your bladder control  Report any side effects of medication to your healthcare provider  Do pelvic muscle exercises often:  Your pelvic muscles help you stop urinating  Squeeze these muscles tight for 5 seconds, then relax for 5 seconds  Gradually work up to squeezing for 10 seconds  Do 3 sets of 15 repetitions a day, or as directed  This will help strengthen your pelvic muscles and improve bladder control  Train your bladder:  Go to the bathroom at set times, such as every 2 hours, even if you do not feel the urge to go  You can also try to hold your urine when you feel the urge to go  For example, hold your urine for 5 minutes when you feel the urge to go  As that becomes easier, hold your urine for 10 minutes  Self-care:   · Keep a UI record  Write down how often you leak urine and how much you leak  Make a note of what you were doing when you leaked urine  · Drink liquids as directed  You may need to limit the amount of liquid you drink to help control your urine leakage  Do not drink any liquid right before you go to bed  Limit or do not have drinks that contain caffeine or alcohol  · Prevent constipation  Eat a variety of high-fiber foods  Good examples are high-fiber cereals, beans, vegetables, and whole-grain breads  Walking is the best way to trigger your intestines to have a bowel movement  · Exercise regularly and maintain a healthy weight  Weight loss and exercise will decrease pressure on your bladder and help you control your leakage  · Use a catheter as directed  to help empty your bladder  A catheter is a tiny, plastic tube that is put into your bladder to drain your urine  · Go to behavior therapy as directed  Behavior therapy may be used to help you learn to control your urge to urinate      Weight Management   Why it is important to manage your weight:  Being overweight increases your risk of health conditions such as heart disease, high blood pressure, type 2 diabetes, and certain types of cancer  It can also increase your risk for osteoarthritis, sleep apnea, and other respiratory problems  Aim for a slow, steady weight loss  Even a small amount of weight loss can lower your risk of health problems  How to lose weight safely:  A safe and healthy way to lose weight is to eat fewer calories and get regular exercise  You can lose up about 1 pound a week by decreasing the number of calories you eat by 500 calories each day  Healthy meal plan for weight management:  A healthy meal plan includes a variety of foods, contains fewer calories, and helps you stay healthy  A healthy meal plan includes the following:  · Eat whole-grain foods more often  A healthy meal plan should contain fiber  Fiber is the part of grains, fruits, and vegetables that is not broken down by your body  Whole-grain foods are healthy and provide extra fiber in your diet  Some examples of whole-grain foods are whole-wheat breads and pastas, oatmeal, brown rice, and bulgur  · Eat a variety of vegetables every day  Include dark, leafy greens such as spinach, kale, joana greens, and mustard greens  Eat yellow and orange vegetables such as carrots, sweet potatoes, and winter squash  · Eat a variety of fruits every day  Choose fresh or canned fruit (canned in its own juice or light syrup) instead of juice  Fruit juice has very little or no fiber  · Eat low-fat dairy foods  Drink fat-free (skim) milk or 1% milk  Eat fat-free yogurt and low-fat cottage cheese  Try low-fat cheeses such as mozzarella and other reduced-fat cheeses  · Choose meat and other protein foods that are low in fat  Choose beans or other legumes such as split peas or lentils  Choose fish, skinless poultry (chicken or turkey), or lean cuts of red meat (beef or pork)  Before you cook meat or poultry, cut off any visible fat  · Use less fat and oil    Try baking foods instead of frying them  Add less fat, such as margarine, sour cream, regular salad dressing and mayonnaise to foods  Eat fewer high-fat foods  Some examples of high-fat foods include french fries, doughnuts, ice cream, and cakes  · Eat fewer sweets  Limit foods and drinks that are high in sugar  This includes candy, cookies, regular soda, and sweetened drinks  Exercise:  Exercise at least 30 minutes per day on most days of the week  Some examples of exercise include walking, biking, dancing, and swimming  You can also fit in more physical activity by taking the stairs instead of the elevator or parking farther away from stores  Ask your healthcare provider about the best exercise plan for you  © Copyright [employer] 2018 Information is for End User's use only and may not be sold, redistributed or otherwise used for commercial purposes  All illustrations and images included in CareNotes® are the copyrighted property of [employer]  or [address] [place] [address] [facility]      YOU CAN CALL 7-738-TNHLBJM OPTION 7, THEN OPTION 1 TO SCHEDULE COVID VACCINE

## 2021-01-18 NOTE — PROGRESS NOTES
Assessment and Plan:     Problem List Items Addressed This Visit     None           Preventive health issues were discussed with patient, and age appropriate screening tests were ordered as noted in patient's After Visit Summary  Personalized health advice and appropriate referrals for health education or preventive services given if needed, as noted in patient's After Visit Summary       History of Present Illness:     Patient presents for Medicare Annual Wellness visit    Patient Care Team:  Larry Kim MD as PCP - General     Problem List:     Patient Active Problem List   Diagnosis    Diabetes mellitus with polyneuropathy ([facility]    Slow transit constipation    Vitamin B12 deficiency    Venous reflux    Type 2 diabetes mellitus with diabetic polyneuropathy, with long-term current use of insulin ([facility] )    Essential hypertension    Right foot ulcer, limited to breakdown of skin ([facility] )    Arteriosclerosis of arteries of extremities ([facility] )    Gait abnormality    Wheelchair bound    BMI 45 0-49 9, adult ([facility] )    Bradycardia      Past Medical and Surgical History:     Past Medical History:   Diagnosis Date    Pulmonary embolism ([facility]     Thromboembolism ([facility]     Tinnitus      Past Surgical History:   Procedure Laterality Date    ADENOIDECTOMY      APPENDECTOMY      CERVICAL FUSION      CHOLECYSTECTOMY      IVC FILTER INSERTION      W/fluorosc angiogr guidance    TONSILLECTOMY      TOTAL ABDOMINAL HYSTERECTOMY W/ BILATERAL SALPINGOOPHORECTOMY      TOTAL HIP ARTHROPLASTY        Family History:     Family History   Problem Relation Age of Onset    Diabetes Brother     Other Brother         Cardiac disorder    Heart attack Brother     Heart attack Mother     Heart attack Brother         CABG x3    Heart attack Brother     Cancer Brother       Social History:        Social History     Socioeconomic History    Marital status: /Civil Union     Spouse name: None    Number of children: None    Years of education: None    Highest education level: None   Occupational History    None   Social Needs    Financial resource strain: None    Food insecurity     Worry: None     Inability: None    Transportation needs     Medical: None     Non-medical: None   Tobacco Use    Smoking status: Never Smoker    Smokeless tobacco: Never Used   Substance and Sexual Activity    Alcohol use: Yes     Comment: Occasional    Drug use: No    Sexual activity: None   Lifestyle    Physical activity     Days per week: None     Minutes per session: None    Stress: None   Relationships    Social connections     Talks on phone: None     Gets together: None     Attends Taoist service: None     Active member of club or organization: None     Attends meetings of clubs or organizations: None     Relationship status: None    Intimate partner violence     Fear of current or ex partner: None     Emotionally abused: None     Physically abused: None     Forced sexual activity: None   Other Topics Concern    None   Social History Narrative    None      Medications and Allergies:     Current Outpatient Medications   Medication Sig Dispense Refill    acetaminophen (TYLENOL) 325 mg tablet Take 2 tablets by mouth every 4 (four) hours as needed      AMILoride 5 mg tablet TAKE 1 TABLET BY MOUTH  DAILY 90 tablet 3    aspirin (ECOTRIN LOW STRENGTH) 81 mg EC tablet Take 162 mg by mouth daily       Blood Glucose Monitoring Suppl (ADVOCATE BLOOD GLUCOSE MONITOR) ARIADNA by Does not apply route 2 (two) times a day 100 each 0    furosemide (LASIX) 40 mg tablet TAKE 1 TABLET BY MOUTH  DAILY 90 tablet 3    insulin aspart (NOVOLOG FLEXPEN) 100 Units/mL injection pen INJECT 2-10 UNITS TWICE DAILY AS NEEDED AS DIRECTED PER SLIDING SCALE 1 mL 2    Insulin Pen Needle (ADVOCATE INSULIN PEN NEEDLES) 31G X 8 MM MISC by Does not apply route 2 (two) times a day 180 each 3    repaglinide (PRANDIN) 2 mg tablet TAKE 1 TABLET BY MOUTH AT BREAKFAST, 1 TABLET AT  LUNCH, AND 2 TABLETS AT  DINNER 360 tablet 3    betamethasone valerate (VALISONE) 0 1 % cream Apply topically 2 (two) times a day (Patient not taking: Reported on 7/17/2020) 15 g 2    bisacodyl (DULCOLAX) 5 mg EC tablet Take 5 mg by mouth daily as needed for constipation      sulfamethoxazole-trimethoprim (BACTRIM DS) 800-160 mg per tablet   3     No current facility-administered medications for this visit  Allergies   Allergen Reactions    Codeine Edema    Eliquis [Apixaban] Rash    Hydrocodone Edema    Cephalexin     Cough Syrup  [Guaifenesin]     Fluticasone-Salmeterol     Lisinopril     Moxifloxacin     Mushroom Extract Complex     Penicillins       Immunizations: There is no immunization history on file for this patient  Health Maintenance: There are no preventive care reminders to display for this patient  Topic Date Due    DTaP,Tdap,and Td Vaccines (1 - Tdap) 03/25/1956    Pneumococcal Vaccine: 65+ Years (1 of 1 - PPSV23) 03/25/2001    Influenza Vaccine (1) 09/01/2020      Medicare Health Risk Assessment:     BP 94/50   Pulse 68   Resp 16      Aruna is here for her Subsequent Wellness visit  Health Risk Assessment:   Patient rates overall health as good  Patient feels that their physical health rating is same  Eyesight was rated as same  Hearing was rated as same  Patient feels that their emotional and mental health rating is same  Pain experienced in the last 7 days has been some  Patient's pain rating has been 5/10  Patient states that she has experienced no weight loss or gain in last 6 months  neuopathy-lj    Depression Screening:   PHQ-2 Score: 0      Fall Risk Screening: In the past year, patient has experienced: no history of falling in past year      Urinary Incontinence Screening:   Patient has leaked urine accidently in the last six months  Home Safety:  Patient does not have trouble with stairs inside or outside of their home  Patient has working smoke alarms and has working carbon monoxide detector  Home safety hazards include: none  Nutrition:   Current diet is Low Carb  Medications:   Patient is currently taking over-the-counter supplements  OTC medications include: see medication list  Patient is able to manage medications  With help--lj    Activities of Daily Living (ADLs)/Instrumental Activities of Daily Living (IADLs):   Walk and transfer into and out of bed and chair?: No  Dress and groom yourself?: No    Bathe or shower yourself?: No    Feed yourself? Yes  Do your laundry/housekeeping?: No  Manage your money, pay your bills and track your expenses?: No  Make your own meals?: No    Do your own shopping?: No    ADL comments: Has help from family with most every thing--lj    Previous Hospitalizations:   Any hospitalizations or ED visits within the last 12 months?: No      Advance Care Planning:     Advanced directive: Yes    End of Life Decisions reviewed with patient: Yes      Cognitive Screening:   Provider or family/friend/caregiver concerned regarding cognition?: No    PREVENTIVE SCREENINGS      Cardiovascular Screening:    General: Screening Current      Diabetes Screening:     General: Screening Not Indicated and History Diabetes      Colorectal Cancer Screening:     General: Screening Not Indicated      Breast Cancer Screening:     General: Screening Not Indicated      Cervical Cancer Screening:    General: Screening Not Indicated      Osteoporosis Screening:    General: Patient Declines      Abdominal Aortic Aneurysm (AAA) Screening:        General: Screening Not Indicated      Lung Cancer Screening:     General: Screening Not Indicated      Hepatitis C Screening:    General: Screening Not Indicated    Other Counseling Topics:   Alcohol use counseling, car/seat belt/driving safety, skin self-exam, sunscreen and calcium and vitamin D intake and regular weightbearing exercise         Julissa Underwood MD

## 2021-01-18 NOTE — ASSESSMENT & PLAN NOTE
Lab Results   Component Value Date    HGBA1C 6 5 (H) 01/09/2021   As above, well controlled  She had been on duloxetine with no relief of her neuropathic symptoms and does not want to titrate, stopped this  Did not do well with side effects from gabapentin and lyrica  Advised on need for good foot and eye care and daily inspection of feet

## 2021-01-18 NOTE — ASSESSMENT & PLAN NOTE
Lab Results   Component Value Date    HGBA1C 6 5 (H) 01/09/2021     Reviewed labs with patient and her daughter and DM is very well controlled  Will continue novolog and prandin as ordered  Advised on need for good foot and eye care

## 2021-01-18 NOTE — ASSESSMENT & PLAN NOTE
BP is well controlled  Continue medications  She continues to follow up with cardiology as well and has upcoming echo scheduled in 3 days

## 2021-01-26 ENCOUNTER — HOSPITAL ENCOUNTER (OUTPATIENT)
Facility: HOSPITAL | Age: 86
Setting detail: OBSERVATION
Discharge: HOSPICE | End: 2021-01-27
Attending: EMERGENCY MEDICINE | Admitting: FAMILY MEDICINE
Payer: COMMERCIAL

## 2021-01-26 ENCOUNTER — APPOINTMENT (EMERGENCY)
Dept: RADIOLOGY | Facility: HOSPITAL | Age: 86
End: 2021-01-26
Payer: COMMERCIAL

## 2021-01-26 ENCOUNTER — TELEPHONE (OUTPATIENT)
Dept: FAMILY MEDICINE CLINIC | Facility: CLINIC | Age: 86
End: 2021-01-26

## 2021-01-26 DIAGNOSIS — I60.9 SUBARACHNOID HEMORRHAGE (HCC): ICD-10-CM

## 2021-01-26 DIAGNOSIS — I60.9 SAH (SUBARACHNOID HEMORRHAGE) (HCC): ICD-10-CM

## 2021-01-26 DIAGNOSIS — Z51.5 COMFORT MEASURES ONLY STATUS: ICD-10-CM

## 2021-01-26 DIAGNOSIS — Z51.5 HOSPICE CARE: Primary | ICD-10-CM

## 2021-01-26 LAB
ALBUMIN SERPL BCP-MCNC: 3 G/DL (ref 3.5–5)
ALP SERPL-CCNC: 86 U/L (ref 46–116)
ALT SERPL W P-5'-P-CCNC: 25 U/L (ref 12–78)
AMMONIA PLAS-SCNC: <10 UMOL/L (ref 11–35)
ANION GAP SERPL CALCULATED.3IONS-SCNC: 11 MMOL/L (ref 4–13)
APTT PPP: 28 SECONDS (ref 23–37)
ARTERIAL PATENCY WRIST A: YES
AST SERPL W P-5'-P-CCNC: 20 U/L (ref 5–45)
BASE EXCESS BLDA CALC-SCNC: 1.4 MMOL/L
BASOPHILS # BLD AUTO: 0.03 THOUSANDS/ΜL (ref 0–0.1)
BASOPHILS NFR BLD AUTO: 0 % (ref 0–1)
BILIRUB SERPL-MCNC: 0.7 MG/DL (ref 0.2–1)
BODY TEMPERATURE: 97.8 DEGREES FEHRENHEIT
BUN SERPL-MCNC: 22 MG/DL (ref 5–25)
CALCIUM ALBUM COR SERPL-MCNC: 10.1 MG/DL (ref 8.3–10.1)
CALCIUM SERPL-MCNC: 9.3 MG/DL (ref 8.3–10.1)
CHLORIDE SERPL-SCNC: 108 MMOL/L (ref 100–108)
CK SERPL-CCNC: 43 U/L (ref 26–192)
CO2 SERPL-SCNC: 26 MMOL/L (ref 21–32)
CREAT SERPL-MCNC: 0.69 MG/DL (ref 0.6–1.3)
EOSINOPHIL # BLD AUTO: 0.01 THOUSAND/ΜL (ref 0–0.61)
EOSINOPHIL NFR BLD AUTO: 0 % (ref 0–6)
ERYTHROCYTE [DISTWIDTH] IN BLOOD BY AUTOMATED COUNT: 12.5 % (ref 11.6–15.1)
FLUAV RNA RESP QL NAA+PROBE: NEGATIVE
FLUBV RNA RESP QL NAA+PROBE: NEGATIVE
GFR SERPL CREATININE-BSD FRML MDRD: 80 ML/MIN/1.73SQ M
GLUCOSE SERPL-MCNC: 131 MG/DL (ref 65–140)
GLUCOSE SERPL-MCNC: 161 MG/DL (ref 65–140)
HCO3 BLDA-SCNC: 25.7 MMOL/L (ref 22–28)
HCT VFR BLD AUTO: 41.7 % (ref 34.8–46.1)
HGB BLD-MCNC: 12.9 G/DL (ref 11.5–15.4)
IMM GRANULOCYTES # BLD AUTO: 0.04 THOUSAND/UL (ref 0–0.2)
IMM GRANULOCYTES NFR BLD AUTO: 1 % (ref 0–2)
INR PPP: 1.06 (ref 0.84–1.19)
LACTATE SERPL-SCNC: 0.8 MMOL/L (ref 0.5–2)
LIPASE SERPL-CCNC: 100 U/L (ref 73–393)
LYMPHOCYTES # BLD AUTO: 1.89 THOUSANDS/ΜL (ref 0.6–4.47)
LYMPHOCYTES NFR BLD AUTO: 26 % (ref 14–44)
MAGNESIUM SERPL-MCNC: 2.6 MG/DL (ref 1.6–2.6)
MCH RBC QN AUTO: 30.9 PG (ref 26.8–34.3)
MCHC RBC AUTO-ENTMCNC: 30.9 G/DL (ref 31.4–37.4)
MCV RBC AUTO: 100 FL (ref 82–98)
MONOCYTES # BLD AUTO: 0.41 THOUSAND/ΜL (ref 0.17–1.22)
MONOCYTES NFR BLD AUTO: 6 % (ref 4–12)
NASAL CANNULA: NORMAL
NEUTROPHILS # BLD AUTO: 5.04 THOUSANDS/ΜL (ref 1.85–7.62)
NEUTS SEG NFR BLD AUTO: 67 % (ref 43–75)
NRBC BLD AUTO-RTO: 0 /100 WBCS
O2 CT BLDA-SCNC: 18 ML/DL (ref 16–23)
OXYHGB MFR BLDA: 96 % (ref 94–97)
PCO2 BLDA: 39.2 MM HG (ref 36–44)
PCO2 TEMP ADJ BLDA: 38.5 MM HG (ref 36–44)
PH BLD: 7.44 [PH] (ref 7.35–7.45)
PH BLDA: 7.43 [PH] (ref 7.35–7.45)
PLATELET # BLD AUTO: 169 THOUSANDS/UL (ref 149–390)
PMV BLD AUTO: 11.6 FL (ref 8.9–12.7)
PO2 BLD: 92.5 MM HG (ref 75–129)
PO2 BLDA: 94.8 MM HG (ref 75–129)
POTASSIUM SERPL-SCNC: 4.2 MMOL/L (ref 3.5–5.3)
PROT SERPL-MCNC: 7.4 G/DL (ref 6.4–8.2)
PROTHROMBIN TIME: 13.7 SECONDS (ref 11.6–14.5)
RBC # BLD AUTO: 4.18 MILLION/UL (ref 3.81–5.12)
RSV RNA RESP QL NAA+PROBE: NEGATIVE
SARS-COV-2 RNA RESP QL NAA+PROBE: NEGATIVE
SODIUM SERPL-SCNC: 145 MMOL/L (ref 136–145)
SPECIMEN SOURCE: NORMAL
TROPONIN I SERPL-MCNC: 0.38 NG/ML
WBC # BLD AUTO: 7.42 THOUSAND/UL (ref 4.31–10.16)

## 2021-01-26 PROCEDURE — 80053 COMPREHEN METABOLIC PANEL: CPT | Performed by: EMERGENCY MEDICINE

## 2021-01-26 PROCEDURE — 83605 ASSAY OF LACTIC ACID: CPT | Performed by: EMERGENCY MEDICINE

## 2021-01-26 PROCEDURE — 99285 EMERGENCY DEPT VISIT HI MDM: CPT

## 2021-01-26 PROCEDURE — 87040 BLOOD CULTURE FOR BACTERIA: CPT | Performed by: EMERGENCY MEDICINE

## 2021-01-26 PROCEDURE — 84484 ASSAY OF TROPONIN QUANT: CPT | Performed by: EMERGENCY MEDICINE

## 2021-01-26 PROCEDURE — 36415 COLL VENOUS BLD VENIPUNCTURE: CPT | Performed by: EMERGENCY MEDICINE

## 2021-01-26 PROCEDURE — 71045 X-RAY EXAM CHEST 1 VIEW: CPT

## 2021-01-26 PROCEDURE — 99219 PR INITIAL OBSERVATION CARE/DAY 50 MINUTES: CPT | Performed by: PHYSICIAN ASSISTANT

## 2021-01-26 PROCEDURE — 93005 ELECTROCARDIOGRAM TRACING: CPT

## 2021-01-26 PROCEDURE — 82805 BLOOD GASES W/O2 SATURATION: CPT | Performed by: EMERGENCY MEDICINE

## 2021-01-26 PROCEDURE — 83690 ASSAY OF LIPASE: CPT | Performed by: EMERGENCY MEDICINE

## 2021-01-26 PROCEDURE — 83735 ASSAY OF MAGNESIUM: CPT | Performed by: EMERGENCY MEDICINE

## 2021-01-26 PROCEDURE — 99291 CRITICAL CARE FIRST HOUR: CPT | Performed by: EMERGENCY MEDICINE

## 2021-01-26 PROCEDURE — 70450 CT HEAD/BRAIN W/O DYE: CPT

## 2021-01-26 PROCEDURE — 85025 COMPLETE CBC W/AUTO DIFF WBC: CPT | Performed by: EMERGENCY MEDICINE

## 2021-01-26 PROCEDURE — G1004 CDSM NDSC: HCPCS

## 2021-01-26 PROCEDURE — 85610 PROTHROMBIN TIME: CPT | Performed by: EMERGENCY MEDICINE

## 2021-01-26 PROCEDURE — 0241U HB NFCT DS VIR RESP RNA 4 TRGT: CPT | Performed by: EMERGENCY MEDICINE

## 2021-01-26 PROCEDURE — 82550 ASSAY OF CK (CPK): CPT | Performed by: EMERGENCY MEDICINE

## 2021-01-26 PROCEDURE — 85730 THROMBOPLASTIN TIME PARTIAL: CPT | Performed by: EMERGENCY MEDICINE

## 2021-01-26 PROCEDURE — 82948 REAGENT STRIP/BLOOD GLUCOSE: CPT

## 2021-01-26 PROCEDURE — 82140 ASSAY OF AMMONIA: CPT | Performed by: EMERGENCY MEDICINE

## 2021-01-26 PROCEDURE — 36600 WITHDRAWAL OF ARTERIAL BLOOD: CPT

## 2021-01-26 RX ORDER — LORAZEPAM 2 MG/ML
0.5 INJECTION INTRAMUSCULAR
Status: DISCONTINUED | OUTPATIENT
Start: 2021-01-26 | End: 2021-01-27 | Stop reason: HOSPADM

## 2021-01-26 RX ORDER — GLYCOPYRROLATE 0.2 MG/ML
0.2 INJECTION INTRAMUSCULAR; INTRAVENOUS
Status: DISCONTINUED | OUTPATIENT
Start: 2021-01-26 | End: 2021-01-27 | Stop reason: HOSPADM

## 2021-01-26 RX ADMIN — GLYCOPYRROLATE 0.2 MG: 0.2 INJECTION, SOLUTION INTRAMUSCULAR; INTRAVENOUS at 23:36

## 2021-01-26 NOTE — TELEPHONE ENCOUNTER
I spoke with Aruna's daughter Edwin Thomas who states her mother is not feeling well  Blood sugar yesterday 180  Sugar is 202 currently and she hasn't eaten in 2 days  Edwin Thomas has only been able to get sips of water in to her mouth yesterday and today  Yesterday she had some nasal congestion and on Sunday she said her cereal didn't taste right to her  She denies dyspnea and her O2 sat is 94%  She is lethargic and doesn't want to open her eyes at all to engage in conversation or to eat anything which is not like her at all  Edwin Thomas agreed to take her to the ER now due to lethargy and poor PO intake for the past few days   Gabby Payment

## 2021-01-26 NOTE — ED PROCEDURE NOTE
PROCEDURE  ECG 12 Lead Documentation Only    Date/Time: 1/26/2021 6:42 PM  Performed by: Russell Ballard DO  Authorized by: Russell Ballard DO     ECG reviewed by me, the ED Provider: yes    Patient location:  ED  Previous ECG:     Previous ECG:  Unavailable  Interpretation:     Interpretation: abnormal    Quality:     Tracing quality:  Limited by artifact  Rate:     ECG rate:  42    ECG rate assessment: bradycardic    Rhythm:     Rhythm: sinus rhythm    Ectopy:     Ectopy: none    QRS:     QRS axis:  Left  Conduction:     Conduction: abnormal      Abnormal conduction: incomplete LBBB    ST segments:     ST segments:  Normal  T waves:     T waves: inverted      Inverted:  III, aVF, II and V3  Q waves:     Q waves:  II, III and aVF         Russell Ballard DO  01/26/21 184

## 2021-01-27 ENCOUNTER — TRANSITIONAL CARE MANAGEMENT (OUTPATIENT)
Dept: FAMILY MEDICINE CLINIC | Facility: CLINIC | Age: 86
End: 2021-01-27

## 2021-01-27 VITALS
OXYGEN SATURATION: 94 % | TEMPERATURE: 98.3 F | SYSTOLIC BLOOD PRESSURE: 146 MMHG | DIASTOLIC BLOOD PRESSURE: 67 MMHG | HEART RATE: 57 BPM | RESPIRATION RATE: 20 BRPM | HEIGHT: 62 IN | BODY MASS INDEX: 47.29 KG/M2 | WEIGHT: 257 LBS

## 2021-01-27 PROCEDURE — 99217 PR OBSERVATION CARE DISCHARGE MANAGEMENT: CPT | Performed by: FAMILY MEDICINE

## 2021-01-27 RX ORDER — LORAZEPAM 2 MG/ML
0.5 CONCENTRATE ORAL EVERY 6 HOURS PRN
Qty: 30 ML | Refills: 0 | Status: SHIPPED | OUTPATIENT
Start: 2021-01-27

## 2021-01-27 RX ORDER — MORPHINE SULFATE 100 MG/5ML
5 SOLUTION, CONCENTRATE ORAL
Qty: 30 ML | Refills: 0 | Status: SHIPPED | OUTPATIENT
Start: 2021-01-27

## 2021-01-27 NOTE — UTILIZATION REVIEW
Initial Clinical Review    Admission: Date/Time/Statement:   Admission Orders (From admission, onward)     Ordered        01/26/21 2037  Place in Observation  Once                   Orders Placed This Encounter   Procedures    Place in Observation     Standing Status:   Standing     Number of Occurrences:   1     Order Specific Question:   Level of Care     Answer:   Med Surg [16]     ED Arrival Information     Expected Arrival Acuity Means of Arrival Escorted By Service Admission Type    - 1/26/2021 17:17 Emergent Ambulance [facility] Emergency    Arrival Complaint    altered mental status        Chief Complaint   Patient presents with    Altered Mental Status     Assessment/Plan: 81 y o  female who presents with PMH of HTN, DM2  She presented to the ED with altered mental status  Patient was unresponsive on arrival to the ED and imaging revealed a large volume subarachnoid hemorrhage with small intraventricular hemorrhage and mild hydrocephalus  The patient's family discussed the imaging findings and overall prognosis with the ED physician and agreed upon comfort measures only  They state that their goal is to bring their mother home on comfort care  The patient will be admitted under comfort care measures only status and will consult case management for hospice care  Comfort measures only status  Assessment & Plan  Patient presented to ED unresponsive, CT showed large subarachnoid hemorrhage  ED physician discussed with family, who were present in the ED and agreed on comfort measures only status  Family would like patient to be at home, but due to concern for having PRN comfort medications overnight, all are agreeable to inpatient care with goal of bringing patient home    - No further labs  - PRN morphine, ativan     SAH (subarachnoid hemorrhage) (HCC)  Assessment & Plan  CT showed large volume SAH with small intraventricular hemorrhage and mild hydrocephalus, possibly 2/2 ruptured aneurysm  - plan as above under comfort measures only  Anticipated Length of Stay:  Patient will be admitted on an Observation basis with an anticipated length of stay of  < 2 midnights     Justification for Hospital Stay: comfort care only, [facility]  ED Triage Vitals [01/26/21 1734]   Temperature Pulse Respirations Blood Pressure SpO2   97 8 °F (36 6 °C) (!) 44 22 (!) 209/77 90 %      Temp Source Heart Rate Source Patient Position - Orthostatic VS BP Location FiO2 (%)   Oral Monitor Lying Left arm --      Pain Score       --          Wt Readings from Last 1 Encounters:   01/26/21 117 kg (257 lb)     Additional Vital Signs:   Pertinent Labs/Diagnostic Test Results:   Results from last 7 days   Lab Units 01/26/21  1738   SARS-COV-2  Negative     Results from last 7 days   Lab Units 01/26/21  1740   WBC Thousand/uL 7 42   HEMOGLOBIN g/dL 12 9   HEMATOCRIT % 41 7   PLATELETS Thousands/uL 169   NEUTROS ABS Thousands/µL 5 04     Results from last 7 days   Lab Units 01/26/21  1740   SODIUM mmol/L 145   POTASSIUM mmol/L 4 2   CHLORIDE mmol/L 108   CO2 mmol/L 26   ANION GAP mmol/L 11   BUN mg/dL 22   CREATININE mg/dL 0 69   EGFR ml/min/1 73sq m 80   CALCIUM mg/dL 9 3   MAGNESIUM mg/dL 2 6     Results from last 7 days   Lab Units 01/26/21  1746 01/26/21  1740   AST U/L  --  20   ALT U/L  --  25   ALK PHOS U/L  --  86   TOTAL PROTEIN g/dL  --  7 4   ALBUMIN g/dL  --  3 0*   TOTAL BILIRUBIN mg/dL  --  0 70   AMMONIA umol/L <10*  --      Results from last 7 days   Lab Units 01/26/21  1721   POC GLUCOSE mg/dl 161*     Results from last 7 days   Lab Units 01/26/21  1740   GLUCOSE RANDOM mg/dL 131             No results found for: BETA-HYDROXYBUTYRATE   Results from last 7 days   Lab Units 01/26/21  1748   PH ART  7 434   PCO2 ART mm Hg 39 2   PO2 ART mm Hg 94 8   HCO3 ART mmol/L 25 7   BASE EXC ART mmol/L 1 4   O2 CONTENT ART mL/dL 18 0   O2 HGB, ARTERIAL % 96 0   ABG SOURCE  Radial, Left             Results from last 7 days   Lab Units 01/26/21  1740   CK TOTAL U/L 43     Results from last 7 days   Lab Units 01/26/21  1740   TROPONIN I ng/mL 0 38*         Results from last 7 days   Lab Units 01/26/21  1740   PROTIME seconds 13 7   INR  1 06   PTT seconds 28             Results from last 7 days   Lab Units 01/26/21  1746   LACTIC ACID mmol/L 0 8                         Results from last 7 days   Lab Units 01/26/21  1740   LIPASE u/L 100                 Results from last 7 days   Lab Units 01/26/21  1738   INFLUENZA A PCR  Negative   INFLUENZA B PCR  Negative   RSV PCR  Negative                             Results from last 7 days   Lab Units 01/26/21  1742   BLOOD CULTURE  Received in Microbiology Lab  Culture in Progress  Received in Microbiology Lab  Culture in Progress  ED Treatment:   Medication Administration from 01/26/2021 1717 to 01/26/2021 2303     None        Past Medical History:   Diagnosis Date    Pulmonary embolism (HCC)     Thromboembolism (HCC)     Tinnitus      Present on Admission:   SAH (subarachnoid hemorrhage) (HCC)      Admitting Diagnosis: Subarachnoid hemorrhage ([facility] [address] ) [I60 9]  Hospice care [Z51 5]  Altered mental state [R41 82]  Age/Sex: 81 y o  female  Admission Orders:  Scheduled Medications:     Continuous IV Infusions:     PRN Meds:  glycopyrrolate, 0 2 mg, Intravenous, Q1H PRN  LORazepam, 0 5 mg, Intravenous, Q1H PRN  morphine injection, 2 mg, Intravenous, Q10 Min PRN        IP CONSULT TO HOSPICE    Network Utilization Review Department  ATTENTION: Please call with any questions or concerns to [phone] and carefully listen to the prompts so that you are directed to the right person  All voicemails are confidential   Ja Monterroso all requests for admission clinical reviews, approved or denied determinations and any other requests to dedicated fax number below belonging to the campus where the patient is receiving treatment   List of dedicated fax numbers for the Facilities:  [facility] ADMISSION DENIALS (Administrative/Medical Necessity) [phone]   [address] (Maternity/NICU/Pediatrics) 261 [facility],7Th Floor [facility] 40 125 [facility]  [phone]   [address] Moise [address] (  Maritza Lee "Nori" 103) [address] [address] [place] [address] [phone]   [address]51 [phone]

## 2021-01-27 NOTE — ED NOTES
Discussion was had between ER MD and daughter at bedside  Given patient prognosis family has decided to allow the patient to become comfort care  Hospital supervisor, charge RN in ED, and ER MD together decided to allow family members to spend time with the patient in the emergency room  Family is aware of visitation policy otherwise, and that should the patient go up to the unit she will be only allowed one person at the bedside at that time  MD gave verbal ok to d/c all monitors at this time as well        Amanda Bowman RN  01/26/21 2008

## 2021-01-27 NOTE — H&P
[address] [facility]  H&P- Elias Chambers 1935, 81 y o  female MRN: [id number]  Unit/Bed#: ED CT2 Encounter: [id number]  Primary Care Provider: Jose A Sosa MD   Date and time admitted to hospital: 1/26/2021  5:33 PM    * Comfort measures only status  Assessment & Plan  Patient presented to ED unresponsive, CT showed large subarachnoid hemorrhage  ED physician discussed with family, who were present in the ED and agreed on comfort measures only status  Family would like patient to be at home, but due to concern for having PRN comfort medications overnight, all are agreeable to inpatient care with goal of bringing patient home  - No further labs  - PRN morphine, ativan    SAH (subarachnoid hemorrhage) ([facility])  Assessment & Plan  CT showed large volume SAH with small intraventricular hemorrhage and mild hydrocephalus, possibly 2/2 ruptured aneurysm  - plan as above under comfort measures only      VTE Prophylaxis: Pharmacologic VTE Prophylaxis contraindicated due to comfort care  / reason for no mechanical VTE prophylaxis comfort care   Code Status: Level 4, comfort measures only  POLST: There is no POLST form on file for this patient (pre-hospital)  Discussion with family: Discussed with family in ED    Anticipated Length of Stay:  Patient will be admitted on an Observation basis with an anticipated length of stay of  < 2 midnights  Justification for Hospital Stay: comfort care only, [address]    Total Time for Visit, including Counseling / Coordination of Care: 30 minutes  Greater than 50% of this total time spent on direct patient counseling and coordination of care  Chief Complaint:   AMS    History of Present Illness: Elias Chambers is a 81 y o  female who presents with PMH of HTN, DM2  She presented to the ED with altered mental status   Patient was unresponsive on arrival to the ED and imaging revealed a large volume subarachnoid hemorrhage with small intraventricular hemorrhage and mild hydrocephalus  The patient's family discussed the imaging findings and overall prognosis with the ED physician and agreed upon comfort measures only  They state that their goal is to bring their mother home on comfort care  The patient will be admitted under comfort care measures only status and will consult case management for hospice care  Review of Systems:    Review of Systems   Unable to perform ROS: Acuity of condition        Past Medical and Surgical History:     Past Medical History:   Diagnosis Date    Pulmonary embolism ([facility] )     Thromboembolism ([facility] )     Tinnitus        Past Surgical History:   Procedure Laterality Date    ADENOIDECTOMY      APPENDECTOMY      CERVICAL FUSION      CHOLECYSTECTOMY      IVC FILTER INSERTION      W/fluorosc angiogr guidance    TONSILLECTOMY      TOTAL ABDOMINAL HYSTERECTOMY W/ BILATERAL SALPINGOOPHORECTOMY      TOTAL HIP ARTHROPLASTY         Meds/Allergies:    Prior to Admission medications    Medication Sig Start Date End Date Taking?  Authorizing Provider   acetaminophen (TYLENOL) 325 mg tablet Take 2 tablets by mouth every 4 (four) hours as needed 10/10/16  Yes Historical Provider, MD   AMILoride 5 mg tablet TAKE 1 TABLET BY MOUTH  DAILY 5/22/20  Yes Gregg Sawyer MD   aspirin (ECOTRIN LOW STRENGTH) 81 mg EC tablet Take 162 mg by mouth daily  10/10/16  Yes Historical Provider, MD   bisacodyl (DULCOLAX) 5 mg EC tablet Take 5 mg by mouth daily as needed for constipation   Yes Historical Provider, MD   Blood Glucose Monitoring Suppl (ADVOCATE BLOOD GLUCOSE MONITOR) ARIADNA by Does not apply route 2 (two) times a day 4/30/18  Yes Juvenal Palmer MD   furosemide (LASIX) 40 mg tablet TAKE 1 TABLET BY MOUTH  DAILY 4/29/20  Yes Juvenal Palmer MD   insulin aspart (NOVOLOG FLEXPEN) 100 Units/mL injection pen INJECT 2-10 UNITS TWICE DAILY AS NEEDED AS DIRECTED PER SLIDING SCALE 5/7/19  Yes Juvenal Palmer MD   Insulin Pen Needle (ADVOCATE INSULIN PEN NEEDLES) 31G X 8 MM MISC by Does not apply route 2 (two) times a day 11/12/18  Yes Pee Smith MD   repaglinide (PRANDIN) 2 mg tablet TAKE 1 TABLET BY MOUTH AT  BREAKFAST, 1 TABLET AT  LUNCH, AND 2 TABLETS AT  C[place] 5/22/20 5/22/21 Yes Gladis Parker MD   sulfamethoxazole-trimethoprim (BACTRIM DS) 800-160 mg per tablet  9/9/19  Yes Historical Provider, MD     I have been unable to obtain / verify an up to date medication list despite all reasonable attempts  Allergies: Allergies   Allergen Reactions    Codeine Edema    Eliquis [Apixaban] Rash    Hydrocodone Edema    Cephalexin     Cough Syrup  [Guaifenesin]     Fluticasone-Salmeterol     Lisinopril     Moxifloxacin     Mushroom Extract Complex     Penicillins        Social History:    Social History     Substance and Sexual Activity   Alcohol Use Yes    Comment: Occasional     Social History     Tobacco Use   Smoking Status Never Smoker   Smokeless Tobacco Never Used     Social History     Substance and Sexual Activity   Drug Use No       Family History:    Family History   Problem Relation Age of Onset    Diabetes Brother     Other Brother         Cardiac disorder    Heart attack Brother     Heart attack Mother     Heart attack Brother         CABG x3    Heart attack Brother     Cancer Brother        Physical Exam:     Vitals:   Blood Pressure: 145/98 (01/26/21 1827)  Pulse: (!) 54 (01/26/21 1827)  Temperature: 97 8 °F (36 6 °C) (01/26/21 1734)  Temp Source: Oral (01/26/21 1734)  Respirations: 22 (01/26/21 1827)  SpO2: 98 % (01/26/21 1827)    Physical Exam  HENT:      Head: Normocephalic and atraumatic  Eyes:      Pupils: Pupils are equal       Right eye: Pupil is not reactive  Left eye: Pupil is not reactive  Comments: Fixed, constricted pupils     Cardiovascular:      Rate and Rhythm: Regular rhythm  Bradycardia present  Heart sounds: Normal heart sounds  No murmur  No gallop  Pulmonary:      Breath sounds: No wheezing, rhonchi or rales  Comments: Labored breathing with accessory muscle use; Abdominal:      General: There is no distension  Palpations: Abdomen is soft  Tenderness: There is no abdominal tenderness  There is no guarding  Skin:     General: Skin is warm and dry  Neurological:      GCS: GCS eye subscore is 1  GCS verbal subscore is 1  GCS motor subscore is 4  Comments: Withdraws to pain, no eye opening, no verbal response           Additional Data:     Lab Results: I have personally reviewed pertinent reports  Results from last 7 days   Lab Units 01/26/21  1740   WBC Thousand/uL 7 42   HEMOGLOBIN g/dL 12 9   HEMATOCRIT % 41 7   PLATELETS Thousands/uL 169   NEUTROS PCT % 67   LYMPHS PCT % 26   MONOS PCT % 6   EOS PCT % 0     Results from last 7 days   Lab Units 01/26/21  1740   SODIUM mmol/L 145   POTASSIUM mmol/L 4 2   CHLORIDE mmol/L 108   CO2 mmol/L 26   BUN mg/dL 22   CREATININE mg/dL 0 69   ANION GAP mmol/L 11   CALCIUM mg/dL 9 3   ALBUMIN g/dL 3 0*   TOTAL BILIRUBIN mg/dL 0 70   ALK PHOS U/L 86   ALT U/L 25   AST U/L 20   GLUCOSE RANDOM mg/dL 131     Results from last 7 days   Lab Units 01/26/21  1740   INR  1 06     Results from last 7 days   Lab Units 01/26/21  1721   POC GLUCOSE mg/dl 161*         Results from last 7 days   Lab Units 01/26/21  1746   LACTIC ACID mmol/L 0 8       Imaging: I have personally reviewed pertinent reports  CT head without contrast   Final Result by Adrián Townsend DO (01/26 1844)      Large volume subarachnoid hemorrhage with small intraventricular hemorrhage and mild hydrocephalus  Findings could be secondary to ruptured aneurysm  I personally discussed this study with Sven Aguilar on 1/26/2021 at 6:44 PM                            Workstation performed: [id number]         XR chest 1 view portable   Final Result by Merline Breslow, MD (01/26 1926)      Mild left basilar airspace disease is in keeping with atelectasis or pneumonia        The study was marked in EPIC for immediate notification  Workstation performed: [id number]             EKG, Pathology, and Other Studies Reviewed on Admission:   · EKG: sinus bradycardia, 42 bpm, LAD, incomplete RBBB    Allscripts / Epic Records Reviewed: Yes     ** Please Note: This note has been constructed using a voice recognition system   **

## 2021-01-27 NOTE — PLAN OF CARE
Problem: Prexisting or High Potential for Compromised Skin Integrity  Goal: Skin integrity is maintained or improved  Description: INTERVENTIONS:  - Identify patients at risk for skin breakdown  - Assess and monitor skin integrity  - Assess and monitor nutrition and hydration status  - Monitor labs   - Assess for incontinence   - Turn and reposition patient  - Assist with mobility/ambulation  - Relieve pressure over bony prominences  - Avoid friction and shearing  - Provide appropriate hygiene as needed including keeping skin clean and dry  - Evaluate need for skin moisturizer/barrier cream  - Collaborate with interdisciplinary team   - Patient/family teaching  - Consider wound care consult   Outcome: Progressing     Problem: Potential for Falls  Goal: Patient will remain free of falls  Description: INTERVENTIONS:  - Assess patient frequently for physical needs  -  Identify cognitive and physical deficits and behaviors that affect risk of falls    -  [place] fall precautions as indicated by assessment   - Educate patient/family on patient safety including physical limitations  - Instruct patient to call for assistance with activity based on assessment  - Modify environment to reduce risk of injury  - Consider OT/PT consult to assist with strengthening/mobility  Outcome: Progressing     Problem: COPING  Goal: Pt/Family able to verbalize concerns and demonstrate effective coping strategies  Description: INTERVENTIONS:  - Assist family to identify coping skills, available support systems and cultural and spiritual values  - Provide emotional support, including active listening and acknowledgement of concerns of caregivers  - Reduce environmental stimuli, as able  - Assess for spiritual pain/suffering and initiate spiritual care, including notification of Pastoral Care or frankie based community as needed  - Assess effectiveness of coping strategies  Outcome: Progressing     Problem: DEATH & DYING  Goal: Pt/Family communicate acceptance of impending death and expresses psychological comfort and peace  Description: INTERVENTIONS:  - Assess family anxiety and grief process related to end of life issues  - Provide emotional, spiritual and psychosocial support  - Provide information about the patient's health status with consideration of family and cultural values  - Communicate willingness to discuss death and facilitate grief process  with family as appropriate  - Emphasize sustaining relationships within family system and community, or frankie/spiritual traditions  - Initiate Spiritual Care, Pastoral care or other ancillary consults as needed  - Refer to community support groups as appropriate  Outcome: Progressing     Problem: DECISION MAKING  Goal: Pt/Family able to effectively weigh alternatives and participate in decision making related to treatment and care  Description: INTERVENTIONS:  - Identify decision maker  - Determine when there are differences among patient's view, family's view, and healthcare provider's view of patient condition and care goals  - Facilitate family articulation of goals for care  - Help family identify pros/cons of alternative solutions  - Provide information as requested by family  - Respect family rights related to privacy and sharing information   - Serve as a liaison between patient, family and health care team  - Initiate consults as appropriate (Ethics Team, Palliative Care, Family Care Conference, etc )  Outcome: Progressing     Problem: SPIRITUAL CARE  Goal: Pt/Family able to move forward in process of forgiving self, others and/or higher power  Description: INTERVENTIONS:  - Assist family with any spiritual needs/requests such as communion, confession, anointing, etc  - Explore guilt and help patient/family identify possible spiritual/cultural beliefs and values  - Explore possibilities of making amends & reconciliation with self, others, and/or a greater power  - Guide family in identifying painful feelings   Help family identify and examine the situation in which these feelings are experienced  - Help family identify destructive displacement of feelings onto other individuals  - Refer patient to formal counseling and/or to [facility] for further support as needed or per request  Outcome: Progressing     Problem: Nutrition/Hydration-ADULT  Goal: Nutrient/Hydration intake appropriate for improving, restoring or maintaining nutritional needs  Description: Monitor and assess patient's nutrition/hydration status for malnutrition  Collaborate with interdisciplinary team and initiate plan and interventions as ordered  Monitor patient's weight and dietary intake as ordered or per policy  Utilize nutrition screening tool and intervene as necessary  Determine patient's food preferences and provide high-protein, high-caloric foods as appropriate       INTERVENTIONS:  - Monitor oral intake, urinary output, labs, and treatment plans  - Assess nutrition and hydration status and recommend course of action  - Evaluate amount of meals eaten  - Assist patient with eating if necessary   - Allow adequate time for meals  - Recommend/ encourage appropriate diets, oral nutritional supplements, and vitamin/mineral supplements  - Order, calculate, and assess calorie counts as needed  - Recommend, monitor, and adjust tube feedings and TPN/PPN based on assessed needs  - Assess need for intravenous fluids  - Provide specific nutrition/hydration education as appropriate  - Include patient/family/caregiver in decisions related to nutrition  Outcome: Progressing

## 2021-01-27 NOTE — DISCHARGE SUMMARY
[facility]    Patient Information: Lilton Goodpasture 81 y o  female MRN: [id number]  Unit/Bed#: [address] [place] [address] Encounter: [id number]    Discharging Physician / Practitioner: Wei Torres DO  PCP: Sonia Cadena MD  Admission Date: 1/26/2021  Discharge Date: 01/27/21    Reason for Admission: Altered Mental Status      Discharge Diagnoses:     Principal Problem:    Comfort measures only status  Active Problems:    SAH (subarachnoid hemorrhage) ([facility] [address] )  Resolved Problems:    * No resolved hospital problems  *        * Comfort measures only status  Assessment & Plan  Patient presented to ED unresponsive, CT showed large subarachnoid hemorrhage  ED physician discussed with family, who were present in the ED and agreed on comfort measures only status  I personally Spoke with patient's daughter who wanted patient to be at home with hospice measures  Daughter met with hospice representative and per hospice recommendation, Rx sent for morphine and Ativan as needed  - PRN Iv morphine, ativan while here    [facility]  Assessment & Plan  CT showed large volume SAH with small intraventricular hemorrhage and mild hydrocephalus, possibly 2/2 ruptured aneurysm  - plan as above to go home with hospice        Consultations During Hospital Stay:  IP CONSULT TO HOSPICE    Procedures Performed:     · none    Significant Findings:     · Refer to hospital course and above listed diagnosis related plan for details    Imaging while in hospital:    Xr Chest 1 View Portable    Result Date: 1/26/2021  Narrative: CHEST INDICATION:   AMS  COMPARISON:  None EXAM PERFORMED/VIEWS:  XR CHEST PORTABLE FINDINGS: Cardiomediastinal silhouette appears unremarkable  Mild left basilar airspace disease is in keeping with atelectasis or pneumonia  Partially imaged lower cervical spine fixation hardware       Impression: Mild left basilar airspace disease is in keeping with atelectasis or pneumonia  The study was marked in [facility] for immediate notification  Workstation performed: [id number]     Ct Head Without Contrast    Result Date: 1/26/2021  Narrative: CT BRAIN - WITHOUT CONTRAST INDICATION:   Altered mental status AMS  COMPARISON:  None  TECHNIQUE:  CT examination of the brain was performed  In addition to axial images, sagittal and coronal 2D reformatted images were created and submitted for interpretation  Radiation dose length product (DLP) for this visit:  961 06 mGy-cm   This examination, like all CT scans performed in the [facility], was performed utilizing techniques to minimize radiation dose exposure, including the use of iterative  reconstruction and automated exposure control  IMAGE QUALITY:  Diagnostic  FINDINGS: PARENCHYMA:  Large volume subarachnoid hemorrhage, scattered throughout the bilateral cerebral hemispheres, as well as within the suprasellar and prepontine cisterns  There is a small volume of intraventricular hemorrhage in the bilateral lateral ventricles  Mild low-attenuation in the periventricular white matter may be secondary to transependymal CSF flow  VENTRICLES AND EXTRA-AXIAL SPACES:  Mild hydrocephalus  VISUALIZED ORBITS AND PARANASAL SINUSES:  Unremarkable  CALVARIUM AND EXTRACRANIAL SOFT TISSUES:  Normal      Impression: Large volume subarachnoid hemorrhage with small intraventricular hemorrhage and mild hydrocephalus  Findings could be secondary to ruptured aneurysm  I personally discussed this study with Ava Monday on 1/26/2021 at 6:44 PM  Workstation performed: [id number]       Incidental Findings:   · none    Test Results Pending at Discharge (will require follow up):   · As per After Visit Summary     Outpatient Tests Requested:  · N/A    Complications:  Refer to hospital course and above listed diagnosis related plan, if any    Hospital Course:      Flaco Winter is a 81 y o  female patient who originally presented to the hospital on 1/26/2021 due to altered mental status  Patient noted to be unresponsive in the ER and CT scan showed subarachnoid hemorrhage  Family agreed with comfort measures only and patient was admitted overnight and evaluated by hospice  Based on discussion with patient's daughter, patient being discharged home with hospice services  Discharge plan coordinated with hospice RN and daughter Loi Woodward who was present at bedside    Please see above list of diagnoses and related plan for additional information  Condition at Discharge: critical     Discharge Day Visit / Exam:     Subjective:  Patient unresponsive    Vitals: Blood Pressure: 146/67 (01/27/21 0829)  Pulse: 57 (01/27/21 0829)  Temperature: 98 3 °F (36 8 °C) (01/27/21 0829)  Temp Source: Axillary (01/27/21 0829)  Respirations: 20 (01/27/21 0829)  Height: 5' 2" (157 5 cm) (01/26/21 2309)  Weight - Scale: 117 kg (257 lb) (01/26/21 2309)  SpO2: 94 % (01/27/21 0829)  Exam:   Physical Exam  Constitutional:       General: She is not in acute distress  Appearance: She is ill-appearing  She is not diaphoretic  HENT:      Head: Normocephalic and atraumatic  Cardiovascular:      Rate and Rhythm: Normal rate and regular rhythm  Pulmonary:      Effort: No respiratory distress  Breath sounds: No wheezing or rales  Comments: Decreased breath sounds bilaterally although patient with poor inspiratory effort  Abdominal:      General: There is no distension  Palpations: Abdomen is soft  Tenderness: There is no abdominal tenderness  Neurological:      Comments: Not opening her eyes, not responding         Discharge instructions/Information to patient and family:(Discharge Medications and Follow up):   See after visit summary for information provided to patient and family  Provisions for Follow-Up Care:  See after visit summary for information related to follow-up care and any pertinent home health orders        Disposition: Home withhospice    Planned Readmission:  No     Discharge Statement:  I spent 25 minutes discharging the patient  This time was spent on the day of discharge  I had direct contact with the patient on the day of discharge  Greater than 50% of the total time was spent examining patient, answering all patient questions, arranging and discussing plan of care with patient as well as directly providing post-discharge instructions  Additional time then spent on discharge activities  Discharge Medications:  See after visit summary for reconciled discharge medications provided to patient and family  ** Please Note: "This note has been constructed using a voice recognition system  Therefore there may be syntax, spelling, and/or grammatical errors   Please call if you have any questions  "**

## 2021-01-27 NOTE — ASSESSMENT & PLAN NOTE
CT showed large volume SAH with small intraventricular hemorrhage and mild hydrocephalus, possibly 2/2 ruptured aneurysm  - plan as above under comfort measures only

## 2021-01-27 NOTE — TELEPHONE ENCOUNTER
Dr Kaleigh Barraza,     Patient was admitted and on hospice and I wanted you to be aware [facility]

## 2021-01-27 NOTE — PLAN OF CARE
Problem: Prexisting or High Potential for Compromised Skin Integrity  Goal: Skin integrity is maintained or improved  Description: INTERVENTIONS:  - Identify patients at risk for skin breakdown  - Assess and monitor skin integrity  - Assess and monitor nutrition and hydration status  - Monitor labs   - Assess for incontinence   - Turn and reposition patient  - Assist with mobility/ambulation  - Relieve pressure over bony prominences  - Avoid friction and shearing  - Provide appropriate hygiene as needed including keeping skin clean and dry  - Evaluate need for skin moisturizer/barrier cream  - Collaborate with interdisciplinary team   - Patient/family teaching  - Consider wound care consult   Outcome: Progressing     Problem: Potential for Falls  Goal: Patient will remain free of falls  Description: INTERVENTIONS:  - Assess patient frequently for physical needs  -  Identify cognitive and physical deficits and behaviors that affect risk of falls    -  [place] fall precautions as indicated by assessment   - Educate patient/family on patient safety including physical limitations  - Instruct patient to call for assistance with activity based on assessment  - Modify environment to reduce risk of injury  - Consider OT/PT consult to assist with strengthening/mobility  Outcome: Progressing     Problem: COPING  Goal: Pt/Family able to verbalize concerns and demonstrate effective coping strategies  Description: INTERVENTIONS:  - Assist family to identify coping skills, available support systems and cultural and spiritual values  - Provide emotional support, including active listening and acknowledgement of concerns of caregivers  - Reduce environmental stimuli, as able  - Assess for spiritual pain/suffering and initiate spiritual care, including notification of Pastoral Care or frankie based community as needed  - Assess effectiveness of coping strategies  Outcome: Progressing     Problem: DEATH & DYING  Goal: Pt/Family communicate acceptance of impending death and expresses psychological comfort and peace  Description: INTERVENTIONS:  - Assess family anxiety and grief process related to end of life issues  - Provide emotional, spiritual and psychosocial support  - Provide information about the patient's health status with consideration of family and cultural values  - Communicate willingness to discuss death and facilitate grief process  with family as appropriate  - Emphasize sustaining relationships within family system and community, or frankie/spiritual traditions  - Initiate Spiritual Care, Pastoral care or other ancillary consults as needed  - Refer to community support groups as appropriate  Outcome: Progressing     Problem: DECISION MAKING  Goal: Pt/Family able to effectively weigh alternatives and participate in decision making related to treatment and care  Description: INTERVENTIONS:  - Identify decision maker  - Determine when there are differences among patient's view, family's view, and healthcare provider's view of patient condition and care goals  - Facilitate family articulation of goals for care  - Help family identify pros/cons of alternative solutions  - Provide information as requested by family  - Respect family rights related to privacy and sharing information   - Serve as a liaison between patient, family and health care team  - Initiate consults as appropriate (Ethics Team, Palliative Care, Family Care Conference, etc )  Outcome: Progressing     Problem: SPIRITUAL CARE  Goal: Pt/Family able to move forward in process of forgiving self, others and/or higher power  Description: INTERVENTIONS:  - Assist family with any spiritual needs/requests such as communion, confession, anointing, etc  - Explore guilt and help patient/family identify possible spiritual/cultural beliefs and values  - Explore possibilities of making amends & reconciliation with self, others, and/or a greater power  - Guide family in identifying painful feelings   Help family identify and examine the situation in which these feelings are experienced  - Help family identify destructive displacement of feelings onto other individuals  - Refer patient to formal counseling and/or to franike community for further support as needed or per request  Outcome: Progressing

## 2021-01-27 NOTE — ED PROVIDER NOTES
History  Chief Complaint   Patient presents with    Altered Mental Status     Patient presents via EMS for evaluation of altered mental status  Caretaker states her last 2 days patient has been getting progressively worse less responsive in eating or drinking  Patient had initially complained of a headache  Patient is unresponsive this time unable to provide any further history  History provided by:  EMS personnel and relative  History limited by:  Patient unresponsive   used: No    Altered Mental Status      Prior to Admission Medications   Prescriptions Last Dose Informant Patient Reported? Taking?    AMILoride 5 mg tablet 1/25/2021 Child No Yes   Sig: TAKE 1 TABLET BY MOUTH  DAILY   Blood Glucose Monitoring Suppl (ADVOCATE BLOOD GLUCOSE MONITOR) ARIADNA 1/25/2021 Child No Yes   Sig: by Does not apply route 2 (two) times a day   Insulin Pen Needle (ADVOCATE INSULIN PEN NEEDLES) 31G X 8 MM MISC 1/25/2021 Child No Yes   Sig: by Does not apply route 2 (two) times a day   acetaminophen (TYLENOL) 325 mg tablet 1/25/2021 Child Yes Yes   Sig: Take 2 tablets by mouth every 4 (four) hours as needed   aspirin (ECOTRIN LOW STRENGTH) 81 mg EC tablet 1/25/2021 Child Yes Yes   Sig: Take 162 mg by mouth daily    bisacodyl (DULCOLAX) 5 mg EC tablet 1/25/2021 Child Yes Yes   Sig: Take 5 mg by mouth daily as needed for constipation   furosemide (LASIX) 40 mg tablet 1/25/2021 Child No Yes   Sig: TAKE 1 TABLET BY MOUTH  DAILY   insulin aspart (NOVOLOG FLEXPEN) 100 Units/mL injection pen 1/25/2021 Child No Yes   Sig: INJECT 2-10 UNITS TWICE DAILY AS NEEDED AS DIRECTED PER SLIDING SCALE   repaglinide (PRANDIN) 2 mg tablet 1/25/2021 Child No Yes   Sig: TAKE 1 TABLET BY MOUTH AT  BREAKFAST, 1 TABLET AT  LUNCH, AND 2 TABLETS AT  DINNER   sulfamethoxazole-trimethoprim (BACTRIM DS) 800-160 mg per tablet 1/25/2021 Child Yes Yes      Facility-Administered Medications: None       Past Medical History:   Diagnosis Date  Pulmonary embolism (HCC)     Thromboembolism (HCC)     Tinnitus        Past Surgical History:   Procedure Laterality Date    ADENOIDECTOMY      APPENDECTOMY      CERVICAL FUSION      CHOLECYSTECTOMY      IVC FILTER INSERTION      W/fluorosc angiogr guidance    TONSILLECTOMY      TOTAL ABDOMINAL HYSTERECTOMY W/ BILATERAL SALPINGOOPHORECTOMY      TOTAL HIP ARTHROPLASTY         Family History   Problem Relation Age of Onset    Diabetes Brother     Other Brother         Cardiac disorder    Heart attack Brother     Heart attack Mother     Heart attack Brother         CABG x3    Heart attack Brother     Cancer Brother      I have reviewed and agree with the history as documented  E-Cigarette/Vaping    E-Cigarette Use Never User      E-Cigarette/Vaping Substances     Social History     Tobacco Use    Smoking status: Never Smoker    Smokeless tobacco: Never Used   Substance Use Topics    Alcohol use: Yes     Comment: Occasional    Drug use: No       Review of Systems   Unable to perform ROS: Patient unresponsive       Physical Exam  Physical Exam  Constitutional:       General: She is in acute distress  Appearance: She is ill-appearing  HENT:      Head: Atraumatic  Mouth/Throat:      Mouth: Mucous membranes are moist       Pharynx: Oropharynx is clear  No oropharyngeal exudate  Eyes:      Pupils: Pupils are equal, round, and reactive to light  Neck:      Musculoskeletal: Normal range of motion and neck supple  Cardiovascular:      Rate and Rhythm: Regular rhythm  Bradycardia present  Pulses: Normal pulses  Pulmonary:      Breath sounds: No wheezing, rhonchi or rales  Comments: Shallow irregular breathing  Abdominal:      General: Abdomen is flat  Bowel sounds are normal  There is no distension  Palpations: Abdomen is soft  Tenderness: There is no abdominal tenderness  There is no guarding or rebound  Musculoskeletal: Normal range of motion        Right lower leg: Edema present  Left lower leg: Edema present  Skin:     Capillary Refill: Capillary refill takes less than 2 seconds  Neurological:      Comments: Patient is unresponsive and does not open her eyes to verbal or painful stimuli  Patient does withdraw from painful stimuli in her upper extremities benign her lower extremities  GCS 6 patient has no eye opening  No verbal response  In flexion withdrawal from pain  Vital Signs  ED Triage Vitals [01/26/21 1734]   Temperature Pulse Respirations Blood Pressure SpO2   97 8 °F (36 6 °C) (!) 44 22 (!) 209/77 90 %      Temp Source Heart Rate Source Patient Position - Orthostatic VS BP Location FiO2 (%)   Oral Monitor Lying Left arm --      Pain Score       --           Vitals:    01/26/21 1734 01/26/21 1827   BP: (!) 209/77 145/98   Pulse: (!) 44 (!) 54   Patient Position - Orthostatic VS: Lying Lying         Visual Acuity      ED Medications  Medications - No data to display    Diagnostic Studies  Results Reviewed     Procedure Component Value Units Date/Time    COVID19, Influenza A/B, RSV PCR, [facility] [[id number]]  (Normal) Collected: 01/26/21 1738    Lab Status: Final result Specimen: Nares from Nasopharyngeal Swab Updated: 01/26/21 1831     SARS-CoV-2 Negative     INFLUENZA A PCR Negative     INFLUENZA B PCR Negative     RSV PCR Negative    Narrative: This test has been authorized by FDA under an EUA (Emergency Use Assay) for use by authorized laboratories  Clinical caution and judgement should be used with the interpretation of these results with consideration of the clinical impression and other laboratory testing  Testing reported as "Positive" or "Negative" has been proven to be accurate according to standard laboratory validation requirements  All testing is performed with control materials showing appropriate reactivity at standard intervals      Lactic acid [[id number]]  (Normal) Collected: 01/26/21 1746    Lab Status: Final result Specimen: Blood from Arm, Right Updated: 01/26/21 1827     LACTIC ACID 0 8 mmol/L     Narrative:      Result may be elevated if tourniquet was used during collection      Ammonia [[id number]]  (Abnormal) Collected: 01/26/21 1746    Lab Status: Final result Specimen: Blood from Arm, Right Updated: 01/26/21 1827     Ammonia <10 umol/L     Troponin I [[id number]]  (Abnormal) Collected: 01/26/21 1740    Lab Status: Final result Specimen: Blood from Arm, Right Updated: 01/26/21 1812     Troponin I 0 38 ng/mL     Lipase [[id number]]  (Normal) Collected: 01/26/21 1740    Lab Status: Final result Specimen: Blood from Arm, Right Updated: 01/26/21 1809     Lipase 100 u/L     Comprehensive metabolic panel [[id number]]  (Abnormal) Collected: 01/26/21 1740    Lab Status: Final result Specimen: Blood from Arm, Right Updated: 01/26/21 1809     Sodium 145 mmol/L      Potassium 4 2 mmol/L      Chloride 108 mmol/L      CO2 26 mmol/L      ANION GAP 11 mmol/L      BUN 22 mg/dL      Creatinine 0 69 mg/dL      Glucose 131 mg/dL      Calcium 9 3 mg/dL      Corrected Calcium 10 1 mg/dL      AST 20 U/L      ALT 25 U/L      Alkaline Phosphatase 86 U/L      Total Protein 7 4 g/dL      Albumin 3 0 g/dL      Total Bilirubin 0 70 mg/dL      eGFR 80 ml/min/1 73sq m     Narrative:      [place] guidelines for Chronic Kidney Disease (CKD):     Stage 1 with normal or high GFR (GFR > 90 mL/min/1 73 square meters)    Stage 2 Mild CKD (GFR = 60-89 mL/min/1 73 square meters)    Stage 3A Moderate CKD (GFR = 45-59 mL/min/1 73 square meters)    Stage 3B Moderate CKD (GFR = 30-44 mL/min/1 73 square meters)    Stage 4 Severe CKD (GFR = 15-29 mL/min/1 73 square meters)    Stage 5 End Stage CKD (GFR <15 mL/min/1 73 square meters)  Note: GFR calculation is accurate only with a steady state creatinine    Magnesium [[id number]]  (Normal) Collected: 01/26/21 1740    Lab Status: Final result Specimen: Blood from Arm, Right Updated: 01/26/21 1809     Magnesium 2 6 mg/dL     CK Total with Reflex CKMB [[id number]]  (Normal) Collected: 01/26/21 1740    Lab Status: Final result Specimen: Blood from Arm, Right Updated: 01/26/21 1809     Total CK 43 U/L     Protime-INR [[id number]]  (Normal) Collected: 01/26/21 1740    Lab Status: Final result Specimen: Blood from Arm, Right Updated: 01/26/21 1804     Protime 13 7 seconds      INR 1 06    APTT [[id number]]  (Normal) Collected: 01/26/21 1740    Lab Status: Final result Specimen: Blood from Arm, Right Updated: 01/26/21 1804     PTT 28 seconds     Blood gas, arterial [[id number]] Collected: 01/26/21 1748    Lab Status: Final result Specimen: Blood, Arterial from Radial, Left Updated: 01/26/21 1756     pH, Arterial 7 434     PH ART TC 7 440     pCO2, Arterial 39 2 mm Hg      PCO2 (TC) Arterial 38 5 mm Hg      pO2, Arterial 94 8 mm Hg      PO2 (TC) Arterial 92 5 mm Hg      HCO3, Arterial 25 7 mmol/L      Base Excess, Arterial 1 4 mmol/L      O2 Content, Arterial 18 0 mL/dL      O2 HGB,Arterial  96 0 %      SOURCE Radial, Left     JG TEST Yes     Temperature 97 8 Degrees Fehrenheit      Nasal Cannula 2 5L    CBC and differential [[id number]]  (Abnormal) Collected: 01/26/21 1740    Lab Status: Final result Specimen: Blood from Arm, Right Updated: 01/26/21 1753     WBC 7 42 Thousand/uL      RBC 4 18 Million/uL      Hemoglobin 12 9 g/dL      Hematocrit 41 7 %       fL      MCH 30 9 pg      MCHC 30 9 g/dL      RDW 12 5 %      MPV 11 6 fL      Platelets 782 Thousands/uL      nRBC 0 /100 WBCs      Neutrophils Relative 67 %      Immat GRANS % 1 %      Lymphocytes Relative 26 %      Monocytes Relative 6 %      Eosinophils Relative 0 %      Basophils Relative 0 %      Neutrophils Absolute 5 04 Thousands/µL      Immature Grans Absolute 0 04 Thousand/uL      Lymphocytes Absolute 1 89 Thousands/µL      Monocytes Absolute 0 41 Thousand/µL      Eosinophils Absolute 0 01 Thousand/µL      Basophils Absolute 0 03 Thousands/µL     Blood culture #1 [[id number]] Collected: 01/26/21 1742    Lab Status: In process Specimen: Blood from Arm, Left Updated: 01/26/21 1752    Blood culture #2 [[id number]] Collected: 01/26/21 1742    Lab Status: In process Specimen: Blood from Arm, Left Updated: 01/26/21 1751    Fingerstick Glucose (POCT) [[id number]]  (Abnormal) Collected: 01/26/21 1721    Lab Status: Final result Updated: 01/26/21 1749     POC Glucose 161 mg/dl     UA (URINE) with reflex to Scope [[id number]]     Lab Status: No result Specimen: Urine                  CT head without contrast   Final Result by Inés Aguilar DO (01/26 1844)      Large volume subarachnoid hemorrhage with small intraventricular hemorrhage and mild hydrocephalus  Findings could be secondary to ruptured aneurysm  I personally discussed this study with Shea Bear on 1/26/2021 at 6:44 PM                            Workstation performed: [id number]         XR chest 1 view portable   Final Result by Kassidy Gill MD (01/26 1926)      Mild left basilar airspace disease is in keeping with atelectasis or pneumonia  The study was marked in [facility] for immediate notification                       Workstation performed: [id number]                    Procedures  CriticalCare Time  Performed by: Guanaco Matamoros DO  Authorized by: Guanaco Matamoros DO     Critical care provider statement:     Critical care time (minutes):  35    Critical care time was exclusive of:  Separately billable procedures and treating other patients    Critical care was necessary to treat or prevent imminent or life-threatening deterioration of the following conditions:  CNS failure or compromise    Critical care was time spent personally by me on the following activities:  Blood draw for specimens, obtaining history from patient or surrogate, development of treatment plan with patient or surrogate, evaluation of patient's response to treatment, examination of patient, review of old charts, re-evaluation of patient's condition, ordering and review of radiographic studies, ordering and performing treatments and interventions, interpretation of cardiac output measurements and ordering and review of laboratory studies             ED Course  ED Course as of Jan 26 2142 Tue Jan 26, 2021 1910 Radiology call patient's subarachnoid hemorrhage or some intraventricular hemorrhage mild hydrocephalus likely secondary to obstructive from the bleeding  It is most closely with a ruptured aneurysm  This consistent with deciding onset of a sharp headache the patient's are states she had reported yesterday  1910 Discussed CT results with Dr Herber Kimble that if we were going to do everything we had to intubate her giving her GCS score in transfer the [place] for neurosurgical evaluation  Daughter at this time states patient would not want that is consider hospice palliative care once a discussed with family members on the phone  18 Spoke to daughter again go spoke with her other siblings is be coming patient hospice palliative care  2037 Patient DNR DNI plan for hospice care will admit tonight for hospice eval                                               MDM  Number of Diagnoses or Management Options  Hospice care:   Subarachnoid hemorrhage [facility]):   Diagnosis management comments: Pulse ox 98% on room air indicating adequate oxygenation  Patient seen initially in room right arrival she was unresponsive returning Michae Needs from painful stimuli  Patient was maintaining her oxygen sat she was bradycardic but had hypertensive  Circular type called has unclear onset of symptoms  Patient was taken emergently is CT scan which unfortunately showed diffuse subarachnoid hemorrhage with some intraventricular hemorrhage with mild hydrocephalus    After discussion with patient's daughter and family patient is DNR DNI they do not wish to pursue active treatment at this time is a feel this was against the patient's wishes and then pursue hospice care  Patient will be admitted overnight for hospice care eval        Amount and/or Complexity of Data Reviewed  Clinical lab tests: ordered and reviewed  Tests in the radiology section of CPT®: ordered and reviewed  Decide to obtain previous medical records or to obtain history from someone other than the patient: yes  Obtain history from someone other than the patient: yes  Review and summarize past medical records: yes  Discuss the patient with other providers: yes    Patient Progress  Patient progress: stable      Disposition  Final diagnoses:   Hospice care   Subarachnoid hemorrhage [facility])     Time reflects when diagnosis was documented in both MDM as applicable and the Disposition within this note     Time User Action Codes Description Comment    1/26/2021  8:16 PM Biscoe Roni Add [Z51 5] Hospice care     1/26/2021  8:16 PM Raheel Henderson Ettatalubna [I60 9] Subarachnoid hemorrhage [facility])       ED Disposition     ED Disposition Condition Date/Time Comment    Admit Stable Tue Jan 26, 2021  8:16 PM Case was discussed with USAMA Hines and the patient's admission status was agreed to be Admission Status: observation status to the service of Dr Luis Miguel Marcelino   Follow-up Information    None         Patient's Medications   Discharge Prescriptions    No medications on file     No discharge procedures on file      PDMP Review     None          ED Provider  Electronically Signed by           Sarkis Gallagher DO  01/26/21 0362

## 2021-01-27 NOTE — ASSESSMENT & PLAN NOTE
Patient presented to ED unresponsive, CT showed large subarachnoid hemorrhage  ED physician discussed with family, who were present in the ED and agreed on comfort measures only status  Family would like patient to be at home, but due to concern for having PRN comfort medications overnight, all are agreeable to inpatient care with goal of bringing patient home    - No further labs  - PRN morphine, ativan

## 2021-01-28 ENCOUNTER — TELEPHONE (OUTPATIENT)
Dept: FAMILY MEDICINE CLINIC | Facility: CLINIC | Age: 86
End: 2021-01-28

## 2021-01-28 LAB
ATRIAL RATE: 42 BPM
P AXIS: 33 DEGREES
PR INTERVAL: 190 MS
QRS AXIS: -60 DEGREES
QRSD INTERVAL: 118 MS
QT INTERVAL: 492 MS
QTC INTERVAL: 410 MS
T WAVE AXIS: -46 DEGREES
VENTRICULAR RATE: 42 BPM

## 2021-01-28 PROCEDURE — 93010 ELECTROCARDIOGRAM REPORT: CPT | Performed by: INTERNAL MEDICINE

## 2021-01-28 NOTE — ASSESSMENT & PLAN NOTE
Patient presented to ED unresponsive, CT showed large subarachnoid hemorrhage  ED physician discussed with family, who were present in the ED and agreed on comfort measures only status     I personally Spoke with patient's daughter who wanted patient to be at home with hospice measures  Daughter met with hospice representative and per hospice recommendation, Rx sent for morphine and Ativan as needed  - PRN Iv morphine, ativan while here

## 2021-01-28 NOTE — ASSESSMENT & PLAN NOTE
CT showed large volume SAH with small intraventricular hemorrhage and mild hydrocephalus, possibly 2/2 ruptured aneurysm  - plan as above to go home with hospice

## 2021-01-28 NOTE — CASE MANAGEMENT
Dr Rosendo Fong informed CM she spoke with patient's daughter Nisha Olivo who is at patient's bedside, she would like pt to discharge home today on [facility] hospice services so pt can be with her family, Virginia Zayra cared for patient's spouse who passed away recently and she is requesting CM arrange transport for pt home  CM contacted dyana ramos to inform her of referral, that family would like pt discharged home today and that patient's daughter Nisha Olivo is at her bedside  Jennifer Pardo reports she is on her way to [facility], she should be at [facility] approximately 1:30 p m today to evaluate pt and speak with daughter Nisha Olivo

## 2021-01-28 NOTE — CASE MANAGEMENT
LOS Day 1, Pt is not a MB, CM Consult Received for Disposition Planning  CM met with pt and her daughter Coretta Romero at bedside, pt appears comfortable with eyes closed, resting with head of bed elevated  CM introduced self/explained role, provided support, completed CM assessment, confirmed she would like pt to discharge home today on [facility], hospice liaison Lubna Jo will be at the hospital approximately 1:30 p m to meet with her and her mother, she would like CM to arrange transport at discharge and discussed freedom of choice regarding hospice agencies and a choice list     Coretta Romero declined a hospice agency choice list, stating [facility] provided home hospice care for her father who passed away about 2 months ago, she was very pleased with their care and would like her mother to receive home hospice care through them as well  Coretta Romero requested CM arrange transport today around 3:00pm If possible so patient's family can come over and spent time with her  CM received the following information from Coretta Romero:  She has been living with pt since this past March in [facility] ran home, with a ramped entrance, Reema Alegria and her 2 sisters have been taking turns caring for pt, pt has not been able to stand or ambulate for the past 8 years, they have lots of DME home including a [facility] hospital bed and wheelchair, pt does not drive they provide transportation when needed, Coretta Romero denies any history of STR, MH, D&A, pt uses [place] pharmacy, denies any difficulty obtaining patient's medications, patient's PCP is Dr Ashley Severino whose office is down the road from where pt resides, pt does not have a LW/POA, declined information on both  CM updated RICHARD Man and Dr Dayanara Coughlin w/same  CM will keep Coretta Romero updated with information as needed

## 2021-01-28 NOTE — TELEPHONE ENCOUNTER
I called daughter Clint Cabral and asked her to call here if she needs anything  She states her mother is resting comfortably and hospice nurse was there today  No further action needed

## 2021-01-28 NOTE — CASE MANAGEMENT
CM received [employer] from [facility] with [facility] stating a 330 p  m  [place] [facility] transport has been arranged, CM informed Dr Ericka Dejesus and RN Deirdre Bullock of same  CM met with pt and daughter Ej Santana at bedside to inform her of transportation time  Ej Santana is CM if additional family members may visit pt in the hospital   CM contacted Kari Banuelos, hospital RN supervisor to ask about family visiting pt in the hospital   Lower responded 1 family member at a time can visit pt and her room  CM met with pt Ej Santana a bedside to inform them of same  Ryan Flavors with [facility] informed CM their hospice RN will see pt at her home tomorrow, pt has all needed DME home which is hers and pt is cleared for discharge from their end

## 2021-02-01 LAB
BACTERIA BLD CULT: NORMAL
BACTERIA BLD CULT: NORMAL

## 2021-03-01 ENCOUNTER — TELEPHONE (OUTPATIENT)
Dept: FAMILY MEDICINE CLINIC | Facility: CLINIC | Age: 86
End: 2021-03-01

## 2021-03-01 NOTE — TELEPHONE ENCOUNTER
Pt daughter dropped off forms for paid family leave  Placed in Dr Kaleigh Barraza folder   Pls advise
